# Patient Record
Sex: MALE | Race: BLACK OR AFRICAN AMERICAN | Employment: PART TIME | URBAN - METROPOLITAN AREA
[De-identification: names, ages, dates, MRNs, and addresses within clinical notes are randomized per-mention and may not be internally consistent; named-entity substitution may affect disease eponyms.]

---

## 2019-10-01 ENCOUNTER — APPOINTMENT (OUTPATIENT)
Dept: CT IMAGING | Age: 49
End: 2019-10-01
Attending: PHYSICIAN ASSISTANT
Payer: MEDICAID

## 2019-10-01 ENCOUNTER — HOSPITAL ENCOUNTER (EMERGENCY)
Age: 49
Discharge: HOME | End: 2019-10-01
Attending: EMERGENCY MEDICINE | Admitting: EMERGENCY MEDICINE
Payer: MEDICAID

## 2019-10-01 VITALS
RESPIRATION RATE: 16 BRPM | HEART RATE: 55 BPM | SYSTOLIC BLOOD PRESSURE: 152 MMHG | DIASTOLIC BLOOD PRESSURE: 91 MMHG | OXYGEN SATURATION: 100 % | TEMPERATURE: 98 F

## 2019-10-01 DIAGNOSIS — R10.9 LEFT FLANK PAIN: Primary | ICD-10-CM

## 2019-10-01 LAB
ALBUMIN SERPL-MCNC: 3.8 G/DL (ref 3.5–5)
ALBUMIN/GLOB SERPL: 1.2 {RATIO} (ref 1.1–2.2)
ALP SERPL-CCNC: 80 U/L (ref 45–117)
ALT SERPL-CCNC: 49 U/L (ref 12–78)
ANION GAP SERPL CALC-SCNC: 7 MMOL/L (ref 5–15)
APPEARANCE UR: CLEAR
AST SERPL-CCNC: 35 U/L (ref 15–37)
ATRIAL RATE: 59 BPM
BACTERIA URNS QL MICRO: NEGATIVE /HPF
BASOPHILS # BLD: 0 K/UL (ref 0–0.1)
BASOPHILS NFR BLD: 1 % (ref 0–1)
BILIRUB SERPL-MCNC: 0.4 MG/DL (ref 0.2–1)
BILIRUB UR QL: NEGATIVE
BUN SERPL-MCNC: 7 MG/DL (ref 6–20)
BUN/CREAT SERPL: 10 (ref 12–20)
CALCIUM SERPL-MCNC: 8.5 MG/DL (ref 8.5–10.1)
CALCULATED P AXIS, ECG09: 35 DEGREES
CALCULATED R AXIS, ECG10: 13 DEGREES
CALCULATED T AXIS, ECG11: 24 DEGREES
CHLORIDE SERPL-SCNC: 107 MMOL/L (ref 97–108)
CO2 SERPL-SCNC: 25 MMOL/L (ref 21–32)
COLOR UR: NORMAL
CREAT SERPL-MCNC: 0.72 MG/DL (ref 0.7–1.3)
DIAGNOSIS, 93000: NORMAL
DIFFERENTIAL METHOD BLD: NORMAL
EOSINOPHIL # BLD: 0 K/UL (ref 0–0.4)
EOSINOPHIL NFR BLD: 0 % (ref 0–7)
EPITH CASTS URNS QL MICRO: NORMAL /LPF
ERYTHROCYTE [DISTWIDTH] IN BLOOD BY AUTOMATED COUNT: 13.4 % (ref 11.5–14.5)
GLOBULIN SER CALC-MCNC: 3.1 G/DL (ref 2–4)
GLUCOSE SERPL-MCNC: 165 MG/DL (ref 65–100)
GLUCOSE UR STRIP.AUTO-MCNC: NEGATIVE MG/DL
HCT VFR BLD AUTO: 44.7 % (ref 36.6–50.3)
HGB BLD-MCNC: 15.1 G/DL (ref 12.1–17)
HGB UR QL STRIP: NEGATIVE
HYALINE CASTS URNS QL MICRO: NORMAL /LPF (ref 0–5)
IMM GRANULOCYTES # BLD AUTO: 0 K/UL
IMM GRANULOCYTES NFR BLD AUTO: 0 %
KETONES UR QL STRIP.AUTO: NEGATIVE MG/DL
LEUKOCYTE ESTERASE UR QL STRIP.AUTO: NEGATIVE
LIPASE SERPL-CCNC: 173 U/L (ref 73–393)
LYMPHOCYTES # BLD: 1.8 K/UL (ref 0.8–3.5)
LYMPHOCYTES NFR BLD: 44 % (ref 12–49)
MCH RBC QN AUTO: 30.9 PG (ref 26–34)
MCHC RBC AUTO-ENTMCNC: 33.8 G/DL (ref 30–36.5)
MCV RBC AUTO: 91.6 FL (ref 80–99)
MONOCYTES # BLD: 0.5 K/UL (ref 0–1)
MONOCYTES NFR BLD: 12 % (ref 5–13)
NEUTS SEG # BLD: 1.8 K/UL (ref 1.8–8)
NEUTS SEG NFR BLD: 43 % (ref 32–75)
NITRITE UR QL STRIP.AUTO: NEGATIVE
NRBC # BLD: 0 K/UL (ref 0–0.01)
NRBC BLD-RTO: 0 PER 100 WBC
P-R INTERVAL, ECG05: 150 MS
PH UR STRIP: 5.5 [PH] (ref 5–8)
PLATELET # BLD AUTO: 235 K/UL (ref 150–400)
PMV BLD AUTO: 10.1 FL (ref 8.9–12.9)
POTASSIUM SERPL-SCNC: 3.5 MMOL/L (ref 3.5–5.1)
PROT SERPL-MCNC: 6.9 G/DL (ref 6.4–8.2)
PROT UR STRIP-MCNC: NEGATIVE MG/DL
Q-T INTERVAL, ECG07: 410 MS
QRS DURATION, ECG06: 86 MS
QTC CALCULATION (BEZET), ECG08: 405 MS
RBC # BLD AUTO: 4.88 M/UL (ref 4.1–5.7)
RBC #/AREA URNS HPF: NORMAL /HPF (ref 0–5)
RBC MORPH BLD: NORMAL
SODIUM SERPL-SCNC: 139 MMOL/L (ref 136–145)
SP GR UR REFRACTOMETRY: 1.01 (ref 1–1.03)
TROPONIN I SERPL-MCNC: <0.05 NG/ML
UR CULT HOLD, URHOLD: NORMAL
UROBILINOGEN UR QL STRIP.AUTO: 0.2 EU/DL (ref 0.2–1)
VENTRICULAR RATE, ECG03: 59 BPM
WBC # BLD AUTO: 4.1 K/UL (ref 4.1–11.1)
WBC URNS QL MICRO: NORMAL /HPF (ref 0–4)

## 2019-10-01 PROCEDURE — 85025 COMPLETE CBC W/AUTO DIFF WBC: CPT

## 2019-10-01 PROCEDURE — 83690 ASSAY OF LIPASE: CPT

## 2019-10-01 PROCEDURE — 80053 COMPREHEN METABOLIC PANEL: CPT

## 2019-10-01 PROCEDURE — 93005 ELECTROCARDIOGRAM TRACING: CPT

## 2019-10-01 PROCEDURE — 36415 COLL VENOUS BLD VENIPUNCTURE: CPT

## 2019-10-01 PROCEDURE — 99284 EMERGENCY DEPT VISIT MOD MDM: CPT

## 2019-10-01 PROCEDURE — 74176 CT ABD & PELVIS W/O CONTRAST: CPT

## 2019-10-01 PROCEDURE — 84484 ASSAY OF TROPONIN QUANT: CPT

## 2019-10-01 PROCEDURE — 96374 THER/PROPH/DIAG INJ IV PUSH: CPT

## 2019-10-01 PROCEDURE — 74011000250 HC RX REV CODE- 250: Performed by: PHYSICIAN ASSISTANT

## 2019-10-01 PROCEDURE — 81001 URINALYSIS AUTO W/SCOPE: CPT

## 2019-10-01 PROCEDURE — 74011250636 HC RX REV CODE- 250/636: Performed by: PHYSICIAN ASSISTANT

## 2019-10-01 RX ORDER — NAPROXEN 500 MG/1
500 TABLET ORAL 2 TIMES DAILY WITH MEALS
Qty: 20 TAB | Refills: 0 | Status: SHIPPED | OUTPATIENT
Start: 2019-10-01 | End: 2019-10-11

## 2019-10-01 RX ORDER — LIDOCAINE 50 MG/G
1 PATCH TOPICAL EVERY 24 HOURS
Status: DISCONTINUED | OUTPATIENT
Start: 2019-10-01 | End: 2019-10-01 | Stop reason: HOSPADM

## 2019-10-01 RX ORDER — LIDOCAINE 40 MG/G
CREAM TOPICAL
Qty: 15 G | Refills: 0 | Status: SHIPPED | OUTPATIENT
Start: 2019-10-01 | End: 2020-02-06 | Stop reason: ALTCHOICE

## 2019-10-01 RX ORDER — METHOCARBAMOL 500 MG/1
500 TABLET, FILM COATED ORAL
Qty: 20 TAB | Refills: 0 | Status: SHIPPED | OUTPATIENT
Start: 2019-10-01 | End: 2020-02-06 | Stop reason: ALTCHOICE

## 2019-10-01 RX ORDER — KETOROLAC TROMETHAMINE 30 MG/ML
15 INJECTION, SOLUTION INTRAMUSCULAR; INTRAVENOUS
Status: COMPLETED | OUTPATIENT
Start: 2019-10-01 | End: 2019-10-01

## 2019-10-01 RX ADMIN — KETOROLAC TROMETHAMINE 15 MG: 30 INJECTION, SOLUTION INTRAMUSCULAR at 09:48

## 2019-10-01 NOTE — ED TRIAGE NOTES
Patient reports swelling and pain to the left upper side he reports it is like the pain he has with pancreatitis flairs.

## 2019-10-01 NOTE — ED NOTES
Patient given discharge instruction and prescription with work note, verbalized understanding.  Pt ambulatory out of ER with steady gait

## 2019-10-01 NOTE — ED PROVIDER NOTES
53 y/o male with PMHx of pancreatitis, presenting with complaint of abdominal pain. The patient states that 2 days ago he began to have left-sided abdominal pain and left flank pain, which worsened today and feels like it's \"locked up. \" He reports similar symptoms in the past due to pancreatitis. The pain is currently 7/10, with occasional radiation to his back or chest. He has not taken anything to relieve the pain. He reports baseline heavy lifting at work, but denies any recent falls or other traumatic injuries. He reports some intermittent constipation and diarrhea, but denies fevers, nausea, vomiting, dysuria, hematuria, or shortness of breath. The history is provided by the patient. Past Medical History:   Diagnosis Date    Pancreatitis        No past surgical history on file. No family history on file.     Social History     Socioeconomic History    Marital status:      Spouse name: Not on file    Number of children: Not on file    Years of education: Not on file    Highest education level: Not on file   Occupational History    Not on file   Social Needs    Financial resource strain: Not on file    Food insecurity:     Worry: Not on file     Inability: Not on file    Transportation needs:     Medical: Not on file     Non-medical: Not on file   Tobacco Use    Smoking status: Current Every Day Smoker     Packs/day: 0.25    Smokeless tobacco: Never Used   Substance and Sexual Activity    Alcohol use: No    Drug use: No    Sexual activity: Not on file   Lifestyle    Physical activity:     Days per week: Not on file     Minutes per session: Not on file    Stress: Not on file   Relationships    Social connections:     Talks on phone: Not on file     Gets together: Not on file     Attends Restorationist service: Not on file     Active member of club or organization: Not on file     Attends meetings of clubs or organizations: Not on file     Relationship status: Not on file   Lia Intimate partner violence:     Fear of current or ex partner: Not on file     Emotionally abused: Not on file     Physically abused: Not on file     Forced sexual activity: Not on file   Other Topics Concern    Not on file   Social History Narrative    Not on file         ALLERGIES: Pork derived (porcine) and Sulfa (sulfonamide antibiotics)    Review of Systems   Constitutional: Negative for chills and fever. Respiratory: Negative for shortness of breath. Cardiovascular: Positive for chest pain (when flank pain is severe). Gastrointestinal: Positive for constipation and diarrhea. Negative for nausea and vomiting. Genitourinary: Negative for dysuria, frequency, hematuria and urgency. Musculoskeletal: Positive for back pain. Neurological: Negative for syncope and light-headedness. All other systems reviewed and are negative. Vitals:    10/01/19 0914   BP: (!) 161/95   Pulse: 60   Resp: 18   Temp: 98.1 °F (36.7 °C)   SpO2: 100%            Physical Exam   Constitutional: He is oriented to person, place, and time. He appears well-developed and well-nourished. No distress. HENT:   Head: Normocephalic and atraumatic. Eyes: Conjunctivae and EOM are normal.   Cardiovascular: Normal rate, regular rhythm and normal heart sounds. Pulmonary/Chest: Effort normal and breath sounds normal.   No chest wall tenderness. Abdominal: Soft. Bowel sounds are normal. He exhibits no distension. There is tenderness (mild LLQ). There is no guarding.   + left flank tenderness to palpation. Neurological: He is alert and oriented to person, place, and time. Skin: Skin is warm and dry. He is not diaphoretic. Nursing note and vitals reviewed.        MDM  Number of Diagnoses or Management Options  Left flank pain:      Amount and/or Complexity of Data Reviewed  Clinical lab tests: ordered and reviewed  Tests in the radiology section of CPT®: ordered and reviewed    Patient Progress  Patient progress: stable Procedures  ED EKG interpretation:  Rhythm: sinus bradycardia; and regular . Rate (approx.): 59; Axis: normal; ST/T wave: normal.        53 y/o male with PMHx of pancreatitis, presenting with complaint of abdominal pain. The patient is well-appearing in no acute distress, abdominal exam benign. UA is negative for infection. EKG without acute ischemic changes, troponin negative. CBC, CMP and lipase are unremarkable. CT abd/pelvis w/o contrast reveals no evidence of kidney stones or other acute abnormalities. Suspect musculoskeletal pain due to heavy lifting. Plan is for discharge home with Rx for naproxen, robaxin and topical lidocaine cream, with instructions for PCP follow up. Strict ED return precautions discussed and provided in writing at time of discharge. The patient verbalized understanding and agreement with this plan. I was personally available for consultation in the emergency department. I have reviewed the chart and agree with the documentation recorded by the [facility], including the assessment, treatment plan, and disposition.   Noel Lott MD

## 2019-10-01 NOTE — LETTER
Jailene Mauricio 57 
[address] [facility] [id number] 
[phone] Work/School Note Date: 10/1/2019 To Whom It May concern: 
 
Louis Haile was seen and treated today in the emergency room by the following provider(s): 
Attending Provider: Polina Stanton MD 
Physician Assistant: OWEN Roberts. Louis Haile may return to work on 10/1/19. Please avoid any heavy lifting until 10/5/19. Sincerely, OWEN Aldrich

## 2020-02-06 ENCOUNTER — HOSPITAL ENCOUNTER (OUTPATIENT)
Dept: LAB | Age: 50
Discharge: HOME | End: 2020-02-06

## 2020-02-06 ENCOUNTER — TELEPHONE (OUTPATIENT)
Dept: INTERNAL MEDICINE CLINIC | Age: 50
End: 2020-02-06

## 2020-02-06 ENCOUNTER — OFFICE VISIT (OUTPATIENT)
Dept: INTERNAL MEDICINE CLINIC | Age: 50
End: 2020-02-06

## 2020-02-06 VITALS
DIASTOLIC BLOOD PRESSURE: 107 MMHG | OXYGEN SATURATION: 98 % | HEART RATE: 69 BPM | WEIGHT: 201 LBS | HEIGHT: 68 IN | SYSTOLIC BLOOD PRESSURE: 151 MMHG | TEMPERATURE: 98.1 F | RESPIRATION RATE: 20 BRPM | BODY MASS INDEX: 30.46 KG/M2

## 2020-02-06 DIAGNOSIS — R10.9 LEFT LATERAL ABDOMINAL PAIN: ICD-10-CM

## 2020-02-06 DIAGNOSIS — R19.4 FREQUENT BOWEL MOVEMENTS: ICD-10-CM

## 2020-02-06 DIAGNOSIS — Z23 ENCOUNTER FOR IMMUNIZATION: ICD-10-CM

## 2020-02-06 DIAGNOSIS — H53.8 BLURRED VISION, BILATERAL: ICD-10-CM

## 2020-02-06 DIAGNOSIS — I10 ESSENTIAL HYPERTENSION: ICD-10-CM

## 2020-02-06 DIAGNOSIS — Z83.3 FAMILY HISTORY OF DIABETES MELLITUS: ICD-10-CM

## 2020-02-06 DIAGNOSIS — D23.4 DERMOID CYST OF SCALP: ICD-10-CM

## 2020-02-06 DIAGNOSIS — Z87.19 HISTORY OF PANCREATITIS: ICD-10-CM

## 2020-02-06 DIAGNOSIS — I10 ESSENTIAL HYPERTENSION: Primary | ICD-10-CM

## 2020-02-06 DIAGNOSIS — R73.09 ELEVATED GLUCOSE: Primary | ICD-10-CM

## 2020-02-06 LAB
ALBUMIN SERPL-MCNC: 4.4 G/DL (ref 3.5–5)
ALBUMIN/GLOB SERPL: 1.3 {RATIO} (ref 1.1–2.2)
ALP SERPL-CCNC: 98 U/L (ref 45–117)
ALT SERPL-CCNC: 50 U/L (ref 12–78)
ANION GAP SERPL CALC-SCNC: 2 MMOL/L (ref 5–15)
AST SERPL-CCNC: 28 U/L (ref 15–37)
BILIRUB SERPL-MCNC: 0.5 MG/DL (ref 0.2–1)
BUN SERPL-MCNC: 8 MG/DL (ref 6–20)
BUN/CREAT SERPL: 9 (ref 12–20)
CALCIUM SERPL-MCNC: 9.3 MG/DL (ref 8.5–10.1)
CHLORIDE SERPL-SCNC: 107 MMOL/L (ref 97–108)
CO2 SERPL-SCNC: 29 MMOL/L (ref 21–32)
CREAT SERPL-MCNC: 0.87 MG/DL (ref 0.7–1.3)
GLOBULIN SER CALC-MCNC: 3.3 G/DL (ref 2–4)
GLUCOSE SERPL-MCNC: 131 MG/DL (ref 65–100)
POTASSIUM SERPL-SCNC: 4.4 MMOL/L (ref 3.5–5.1)
PROT SERPL-MCNC: 7.7 G/DL (ref 6.4–8.2)
SODIUM SERPL-SCNC: 138 MMOL/L (ref 136–145)

## 2020-02-06 RX ORDER — AMLODIPINE BESYLATE 10 MG/1
10 TABLET ORAL DAILY
Qty: 30 TAB | Refills: 2 | Status: SHIPPED | OUTPATIENT
Start: 2020-02-06 | End: 2020-10-06 | Stop reason: ALTCHOICE

## 2020-02-06 NOTE — TELEPHONE ENCOUNTER
Writer received call from the lab and they cannot run the A1C, they do not have the right tubing/blood for this test.

## 2020-02-06 NOTE — PROGRESS NOTES
[facility] patient has been in the hospital and [facility] with Pancreatitis, patient was taken off all medications. Patient is currently not taking anything for his BP. Patient to have a flu shot today. Patient c/o of swelling and cramping on left flank area, patient having multiple BM in the morning, becomes urgent. Patient does not have a gastro doctor. Jeff Cooney is a 54 y.o. male who presents for routine immunizations. He denies any symptoms , reactions or allergies that would exclude them from being immunized today. Risks and adverse reactions were discussed and the VIS was given to them. All questions were addressed. He was observed for 5 min post injection. There were no reactions observed.     Steffi Diaz LPN

## 2020-02-06 NOTE — PROGRESS NOTES
Chief Complaint   Patient presents with   [address] Hypertension     he is a 54y.o. year old male who presents for evaluation. Patient presents to the office to get established. He reports he has not seen a physician in a long period of time. He has been seen at the ER for pancreatitis. The patient reports the last time he was seen at the ER it was for left side abdominal pain. He states at that time he was told that he was not currently having problems with his pancreas. He states he was discharged but was not told if he should follow-up with a gastroenterologist or not patient also reports that he has noticed he has had some blurred vision for some time. Patient denies having frequency of urination or the sensation of being thirsty all the time. The patient also has an area on the top of his head that he would like to have looked at. Reports that the area is not tender to touch. The patient reports that the pain has noticed that his bowel habits have changed he would normally have 2-3 bowel movements each morning. He states once his bowels has been empty he is good until the next day. 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 He states he has not noticed any difference in his bowel habits based on foods that he eat. Patient states actually normally eats 1 to 2 meals a day. He is not much of an eater. He reports he does not get regular exercise.     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[employer])   Tobacco Use    Smoking status: Current Every Day Smoker     Packs/day: 0.25     Types: Cigarettes     Start date: 2/6/2015    Smokeless tobacco: Never Used   Substance and Sexual Activity    Alcohol use: Yes     Frequency: 4 or more times a week     Drinks per session: 3 or 4     Binge frequency: Weekly     Comment: wine    Drug use: Yes     Frequency: 1.0 times per week     Types: Marijuana     Comment: intermittent cigar size last one week    Sexual activity: Yes     Partners: Female     Birth control/protection: None     Comment: none         Review of Systems - negative except as listed above    Objective:     Vitals:    02/06/20 0822 02/06/20 0842   BP: (!) 149/111 (!) 151/107   Pulse: 73 69   Resp: 20    Temp: 98.1 °F (36.7 °C)    TempSrc: Oral    SpO2: 98%    Weight: 201 lb (91.2 kg)    Height: 5' 8\" (1.727 m)      Physical Examination: General appearance - alert, well appearing, and in no distress  Mental status - normal mood, behavior, speech, dress, motor activity, and thought processes  Chest - clear to auscultation, no wheezes, rales or rhonchi, symmetric air entry  Heart - normal rate and regular rhythm, no murmurs noted  Abdomen - soft, nontender, nondistended, no masses or organomegaly  Extremities - no pedal edema noted, intact peripheral pulses  Skin -soft palpable mass noted of the left side of scalp. The mass is very soft and smooth feeling. It is approximately 2 cm in size. Assessment/ Plan:   Diagnoses and all orders for this visit:    1. Essential hypertension  -     amLODIPine (NORVASC) 10 mg tablet; Take 1 Tab by mouth daily.  -     METABOLIC PANEL, COMPREHENSIVE; Future  -     REFERRAL TO OPHTHALMOLOGY    2.  Frequent bowel movements  -     REFERRAL TO GASTROENTEROLOGY    3. History of pancreatitis  -     REFERRAL TO GASTROENTEROLOGY    4. Left lateral abdominal pain  - REFERRAL TO GASTROENTEROLOGY    5. Blurred vision, bilateral  -     REFERRAL TO OPHTHALMOLOGY    6. Dermoid cyst of scalp    7. Encounter for immunization  -     INFLUENZA VIRUS VAC QUAD,SPLIT,PRESV FREE SYRINGE IM  -     IA IMMUNIZ ADMIN,1 SINGLE/COMB VAC/TOXOID    Patient I discussed his elevated blood pressure here in the office today. He states at one point when he was admitted to the hospital he was on blood pressure medicine but he was told that he could stop it once he went home. I have started the patient back on Norvasc since he has been on this previously when he was in the hospital.  I would like for him to return back to the office in 2 weeks so that we may recheck his blood pressure. The patient has been given a referral to an ophthalmologist.  I explained to him the area on his scalp appears to be a cyst.  At this time he does not want to see a dermatologist to have this removed. I would like for the patient to get labs done to see if his glucose is elevated and check kidney function. The patient has been given a referral to the gastroenterologist for further evaluation of his left sided pain and change in bowels. Patient will receive a flu shot here in the office today. I have discussed the diagnosis with the patient and the intended plan as seen in the above orders. The patient has received an after-visit summary and questions were answered concerning future plans.      Medication Side Effects and Warnings were discussed with patient: yes  Patient Labs were reviewed and or requested: n/a  Patient Past Records were reviewed and or requested  yes    Martha Chacon PA-C

## 2020-02-06 NOTE — PROGRESS NOTES
Writer contacted patient to inform of lab results and added lab per Hector Welch, patient verbalized understanding.

## 2020-02-06 NOTE — TELEPHONE ENCOUNTER
Please let the patient know his glucose was a little elevated.  I would like for him to get a hemoglobin a 1c lab slip will be up front

## 2020-02-06 NOTE — PATIENT INSTRUCTIONS
Vaccine Information Statement    Influenza (Flu) Vaccine (Inactivated or Recombinant): What You Need to Know    Many Vaccine Information Statements are available in Croatian and other languages. See www.immunize.org/vis  Hojas de información sobre vacunas están disponibles en español y en muchos otros idiomas. Visite www.immunize.org/vis    1. Why get vaccinated? Influenza vaccine can prevent influenza (flu). Flu is a contagious disease that spreads around the [facility] every year, usually between October and May. Anyone can get the flu, but it is more dangerous for some people. Infants and young children, people 72years of age and older, pregnant women, and people with certain health conditions or a weakened immune system are at greatest risk of flu complications. Pneumonia, bronchitis, sinus infections and ear infections are examples of flu-related complications. If you have a medical condition, such as heart disease, cancer or diabetes, flu can make it worse. Flu can cause fever and chills, sore throat, muscle aches, fatigue, cough, headache, and runny or stuffy nose. Some people may have vomiting and diarrhea, though this is more common in children than adults. Each year thousands of people in the [facility] die from flu, and many more are hospitalized. Flu vaccine prevents millions of illnesses and flu-related visits to the doctor each year. 2. Influenza vaccines     CDC recommends everyone 10months of age and older get vaccinated every flu season. Children 6 months through 6years of age may need 2 doses during a single flu season. Everyone else needs only 1 dose each flu season. It takes about 2 weeks for protection to develop after vaccination. There are many flu viruses, and they are always changing. Each year a new flu vaccine is made to protect against three or four viruses that are likely to cause disease in the upcoming flu season.  Even when the vaccine doesnt exactly match these viruses, it may still provide some protection. Influenza vaccine does not cause flu. Influenza vaccine may be given at the same time as other vaccines. 3. Talk with your health care provider    Tell your vaccine provider if the person getting the vaccine:   Has had an allergic reaction after a previous dose of influenza vaccine, or has any severe, life-threatening allergies.  Has ever had Guillain-Barré Syndrome (also called GBS). In some cases, your health care provider may decide to postpone influenza vaccination to a future visit. People with minor illnesses, such as a cold, may be vaccinated. People who are moderately or severely ill should usually wait until they recover before getting influenza vaccine. Your health care provider can give you more information. 4. Risks of a reaction     Soreness, redness, and swelling where shot is given, fever, muscle aches, and headache can happen after influenza vaccine.  There may be a very small increased risk of Guillain-Barré Syndrome (GBS) after inactivated influenza vaccine (the flu shot). [employer] children who get the flu shot along with pneumococcal vaccine (PCV13), and/or DTaP vaccine at the same time might be slightly more likely to have a seizure caused by fever. Tell your health care provider if a child who is getting flu vaccine has ever had a seizure. People sometimes faint after medical procedures, including vaccination. Tell your provider if you feel dizzy or have vision changes or ringing in the ears. As with any medicine, there is a very remote chance of a vaccine causing a severe allergic reaction, other serious injury, or death. 5. What if there is a serious problem? An allergic reaction could occur after the vaccinated person leaves the clinic.  If you see signs of a severe allergic reaction (hives, swelling of the face and throat, difficulty breathing, a fast heartbeat, dizziness, or weakness), call 9-1-1 and get the person to the nearest hospital.    For other signs that concern you, call your health care provider. Adverse reactions should be reported to the Vaccine Adverse Event Reporting System (VAERS). Your health care provider will usually file this report, or you can do it yourself. Visit the VAERS website at www.vaers. [facility].gov or call 1-890.438.5879. VAERS is only for reporting reactions, and VAERS staff do not give medical advice. 6. The National Vaccine Injury Compensation Program    The [facility] Vaccine Injury Compensation Program (VICP) is a federal program that was created to compensate people who may have been injured by certain vaccines. Visit the VICP website at www.[place].gov/vaccinecompensation or call 8-402.826.8399 to learn about the program and about filing a claim. There is a time limit to file a claim for compensation. 7. How can I learn more?  Ask your health care provider.  Call your local or state health department.  Contact the Centers for Disease Control and Prevention (CDC):  - Call 0-636.226.1593 (1-800-CDC-INFO) or  - Visit CDCs influenza website at www.cdc.gov/flu    Vaccine Information Statement (Interim)  Inactivated Influenza Vaccine   8/15/2019  [address] ARACELIS [address] [id number]-04   Department of Health and Human Services  Centers for Disease Control and Prevention    Office Use Only

## 2020-02-06 NOTE — PROGRESS NOTES
Please let the patient know his glucose was elevated. I would like to add hemoglobin a1c the rest of the labs are okay.   thank you

## 2020-02-10 ENCOUNTER — HOSPITAL ENCOUNTER (OUTPATIENT)
Dept: LAB | Age: 50
Discharge: HOME | End: 2020-02-10

## 2020-02-10 DIAGNOSIS — Z83.3 FAMILY HISTORY OF DIABETES MELLITUS: ICD-10-CM

## 2020-02-10 DIAGNOSIS — R73.09 ELEVATED GLUCOSE: ICD-10-CM

## 2020-02-10 LAB
EST. AVERAGE GLUCOSE BLD GHB EST-MCNC: 128 MG/DL
HBA1C MFR BLD: 6.1 % (ref 4–5.6)

## 2020-02-11 NOTE — PROGRESS NOTES
Please let the patient know his hemoglobin a1c is 6.1. this is consider to be pre-diabetes. That means the patient should limit sugary foods and foods high in carbs. Also get regular exercise and work on weight.  thanks

## 2020-02-27 ENCOUNTER — OFFICE VISIT (OUTPATIENT)
Dept: INTERNAL MEDICINE CLINIC | Age: 50
End: 2020-02-27

## 2020-02-27 VITALS
HEART RATE: 62 BPM | DIASTOLIC BLOOD PRESSURE: 87 MMHG | SYSTOLIC BLOOD PRESSURE: 125 MMHG | TEMPERATURE: 98.4 F | RESPIRATION RATE: 20 BRPM | HEIGHT: 68 IN | BODY MASS INDEX: 30.77 KG/M2 | WEIGHT: 203 LBS | OXYGEN SATURATION: 97 %

## 2020-02-27 DIAGNOSIS — Z13.220 SCREENING CHOLESTEROL LEVEL: ICD-10-CM

## 2020-02-27 DIAGNOSIS — R73.03 PREDIABETES: ICD-10-CM

## 2020-02-27 DIAGNOSIS — I10 ESSENTIAL HYPERTENSION: Primary | ICD-10-CM

## 2020-02-27 NOTE — PROGRESS NOTES
Subjective:     Denise Stevens is a 54 y.o. male who presents for follow up of hypertension. Diet and Lifestyle: generally follows a low sodium diet  Home BP Monitoring: is not measured at home    Cardiovascular ROS: taking medications as instructed, no medication side effects noted, no TIA's, no chest pain on exertion, no dyspnea on exertion, no swelling of ankles. New concerns: recheck blood pressure. Reports he has not had any problems with the medication     Patient Active Problem List    Diagnosis Date Noted    Hypertension 06/07/2016    Chronic pancreatitis ([facility] [address] 06/07/2016     Current Outpatient Medications   Medication Sig Dispense Refill    amLODIPine (NORVASC) 10 mg tablet Take 1 Tab by mouth daily. 30 Tab 2     Allergies   Allergen Reactions    Pork Derived (Porcine) Swelling and Angioedema    Sulfa (Sulfonamide Antibiotics) Rash             Review of Systems, additional:  Pertinent items are noted in HPI. Objective:     Visit Vitals  /87   Pulse 62   Temp 98.4 °F (36.9 °C) (Oral)   Resp 20   Ht 5' 8\" (1.727 m)   Wt 203 lb (92.1 kg)   SpO2 97%   BMI 30.87 kg/m²     Appearance: alert, well appearing, and in no distress. General exam: CVS exam BP noted to be well controlled today in office, S1, S2 normal, no gallop, no murmur, chest clear, no edema. Lab review: previous labs reviewed. Assessment/Plan:     hypertension well controlled. current treatment plan is effective, no change in therapy  reviewed diet, exercise and weight control. Diagnoses and all orders for this visit:    1. Essential hypertension    2. Prediabetes    3. Screening cholesterol level  -     LIPID PANEL; Future    patient to continue his current medication regiment. Advised him to make sure he watch his diet and try to get regular exercise. He should get his cholesterol checked. He will be contacted with the results once back. Patient should follow up in 6 months.

## 2020-10-06 ENCOUNTER — OFFICE VISIT (OUTPATIENT)
Dept: INTERNAL MEDICINE CLINIC | Age: 50
End: 2020-10-06
Payer: MEDICAID

## 2020-10-06 VITALS
DIASTOLIC BLOOD PRESSURE: 100 MMHG | HEART RATE: 64 BPM | BODY MASS INDEX: 29.16 KG/M2 | TEMPERATURE: 96 F | HEIGHT: 68 IN | RESPIRATION RATE: 16 BRPM | OXYGEN SATURATION: 98 % | WEIGHT: 192.4 LBS | SYSTOLIC BLOOD PRESSURE: 151 MMHG

## 2020-10-06 DIAGNOSIS — R73.03 PREDIABETES: ICD-10-CM

## 2020-10-06 DIAGNOSIS — Z23 NEEDS FLU SHOT: ICD-10-CM

## 2020-10-06 DIAGNOSIS — I10 ESSENTIAL HYPERTENSION: ICD-10-CM

## 2020-10-06 DIAGNOSIS — L02.224 BOIL OF GROIN: Primary | ICD-10-CM

## 2020-10-06 DIAGNOSIS — D17.0 LIPOMA OF HEAD: ICD-10-CM

## 2020-10-06 LAB
ALBUMIN SERPL-MCNC: 4.2 G/DL (ref 3.5–5)
ALBUMIN/GLOB SERPL: 1.4 {RATIO} (ref 1.1–2.2)
ALP SERPL-CCNC: 90 U/L (ref 45–117)
ALT SERPL-CCNC: 57 U/L (ref 12–78)
ANION GAP SERPL CALC-SCNC: 3 MMOL/L (ref 5–15)
AST SERPL-CCNC: 25 U/L (ref 15–37)
BILIRUB SERPL-MCNC: 0.4 MG/DL (ref 0.2–1)
BUN SERPL-MCNC: 6 MG/DL (ref 6–20)
BUN/CREAT SERPL: 7 (ref 12–20)
CALCIUM SERPL-MCNC: 9.1 MG/DL (ref 8.5–10.1)
CHLORIDE SERPL-SCNC: 107 MMOL/L (ref 97–108)
CO2 SERPL-SCNC: 29 MMOL/L (ref 21–32)
CREAT SERPL-MCNC: 0.89 MG/DL (ref 0.7–1.3)
EST. AVERAGE GLUCOSE BLD GHB EST-MCNC: 131 MG/DL
GLOBULIN SER CALC-MCNC: 3.1 G/DL (ref 2–4)
GLUCOSE SERPL-MCNC: 104 MG/DL (ref 65–100)
HBA1C MFR BLD: 6.2 % (ref 4–5.6)
POTASSIUM SERPL-SCNC: 4.3 MMOL/L (ref 3.5–5.1)
PROT SERPL-MCNC: 7.3 G/DL (ref 6.4–8.2)
SODIUM SERPL-SCNC: 139 MMOL/L (ref 136–145)

## 2020-10-06 PROCEDURE — 99214 OFFICE O/P EST MOD 30 MIN: CPT | Performed by: PHYSICIAN ASSISTANT

## 2020-10-06 PROCEDURE — 90686 IIV4 VACC NO PRSV 0.5 ML IM: CPT | Performed by: PHYSICIAN ASSISTANT

## 2020-10-06 RX ORDER — LISINOPRIL 40 MG/1
40 TABLET ORAL DAILY
Qty: 30 TAB | Refills: 3 | Status: SHIPPED | OUTPATIENT
Start: 2020-10-06 | End: 2021-02-15

## 2020-10-06 RX ORDER — DOXYCYCLINE 100 MG/1
100 TABLET ORAL 2 TIMES DAILY
Qty: 20 TAB | Refills: 0 | Status: SHIPPED | OUTPATIENT
Start: 2020-10-06 | End: 2020-11-10 | Stop reason: ALTCHOICE

## 2020-10-06 NOTE — PROGRESS NOTES
Chief Complaint   Patient presents with    Cyst     boil on left side of gorin , onset week ago     Immunization/Injection     flu shot        1. Have you been to the ER, urgent care clinic since your last visit? Hospitalized since your last visit? No    2. Have you seen or consulted any other health care providers outside of the [address] [place] [address] since your last visit? Include any pap smears or colon screening.  No

## 2020-10-07 NOTE — PROGRESS NOTES
Please let the patient know he still in the prediabetic range. It is about the same as the last time. Advised him to please work on watching diet, losing weight, and starting a regular exercise regiment. Walking is a great exercise. Please let him know that his comprehensive lab was fine.

## 2020-10-07 NOTE — PROGRESS NOTES
Chief Complaint   Patient presents with    Cyst     boil on left side of gorin , onset week ago     Immunization/Injection     flu shot      he is a 52y.o. year old male who presents for evaluation. Patient presents the office today for a number of concerns. He reports that he is stopped taking his blood pressure medicine because it was causing some swelling of his legs. Patient was taking amlodipine at one point. He also reports that he has a cyst or boil of some kind on the inside of his left groin area. Patient reports that the area is sore to touch. Patient also reports that he is interested in having the cyst on his head removed. Patient would like to get a referral if possible. Lastly the patient would like to get his flu vaccine today. Reviewed and agree with Nurse Note and duplicated in this note. Reviewed PmHx, RxHx, FmHx, SocHx, AllgHx and updated and dated in the chart. Review of Systems - negative except as listed above    Objective:     Vitals:    10/06/20 1050   BP: (!) 151/100   Pulse: 64   Resp: 16   Temp: (!) 96 °F (35.6 °C)   TempSrc: Temporal   SpO2: 98%   Weight: 192 lb 6.4 oz (87.3 kg)   Height: 5' 8\" (1.727 m)     Physical Examination: General appearance - alert, well appearing, and in no distress and overweight  Mental status - normal mood, behavior, speech, dress, motor activity, and thought processes  Chest - clear to auscultation, no wheezes, rales or rhonchi, symmetric air entry  Heart - normal rate and regular rhythm, no murmurs noted  Skin -inner left thigh ways papule noted. Area a little larger than pea-sized. Area is tender to palpation. No apparent drainage noted from the area. Head soft mass appreciated just slightly to midline of scalp. No tenderness with palpation    Assessment/ Plan:   Diagnoses and all orders for this visit:    1. Boil of groin  -     doxycycline (ADOXA) 100 mg tablet; Take 1 Tab by mouth two (2) times a day.     2. Essential hypertension  -     lisinopriL (PRINIVIL, ZESTRIL) 40 mg tablet; Take 1 Tab by mouth daily.  -     METABOLIC PANEL, COMPREHENSIVE; Future    3. Prediabetes  -     METABOLIC PANEL, COMPREHENSIVE; Future  -     HEMOGLOBIN A1C WITH EAG; Future    4. Lipoma of head  -     REFERRAL TO DERMATOLOGY    5. Needs flu shot  -     INFLUENZA VIRUS VAC QUAD,SPLIT,PRESV FREE SYRINGE IM    Advised the patient to keep warm compress to the area. Take antibiotic as prescribed. Explained to the patient that the area may drain at some point and that is okay. Referral given for lipoma on head. Advised the patient that if the dermatologist does not take his insurance to let me know because I need referral for him to see a surgeon should be able to take care of this as well. Patient is to get some labs done. He will be contacted with results once they are back. I started the patient on lisinopril 40 mg this is to replace the amlodipine. I have asked the patient to return to the office in 1 month so we can recheck his blood pressure. Patient to get flu vaccine today. I have discussed the diagnosis with the patient and the intended plan as seen in the above orders. The patient has received an after-visit summary and questions were answered concerning future plans.      Medication Side Effects and Warnings were discussed with patient: yes  Patient Labs were reviewed and or requested: yes  Patient Past Records were reviewed and or requested  yes    Martha Chacon PA-C

## 2020-10-14 ENCOUNTER — OFFICE VISIT (OUTPATIENT)
Dept: SURGERY | Age: 50
End: 2020-10-14
Payer: MEDICAID

## 2020-10-14 VITALS
HEART RATE: 60 BPM | SYSTOLIC BLOOD PRESSURE: 132 MMHG | WEIGHT: 196 LBS | TEMPERATURE: 98.7 F | BODY MASS INDEX: 29.7 KG/M2 | OXYGEN SATURATION: 98 % | HEIGHT: 68 IN | RESPIRATION RATE: 18 BRPM | DIASTOLIC BLOOD PRESSURE: 81 MMHG

## 2020-10-14 DIAGNOSIS — L72.9 SCALP CYST: ICD-10-CM

## 2020-10-14 PROBLEM — Z72.0 TOBACCO ABUSE: Status: ACTIVE | Noted: 2020-10-14

## 2020-10-14 PROCEDURE — 99203 OFFICE O/P NEW LOW 30 MIN: CPT | Performed by: SURGERY

## 2020-10-14 NOTE — H&P (VIEW-ONLY)
Surgery History and Physical 
 
Subjective:  
  
Clint Castro is a 52 y.o. black male who presents for evaluation of a lump in his scalp. For the past few years, Mr. Georges Guzman has had a lump in the back of his scalp. The lump is now enlarging. He denies any pain or drainage. He has no personal or family h/o malignant soft tissue tumors. He had a benign lump removed from his scalp several years ago at [facility]. Past Medical History:  
Diagnosis Date  H/O chronic pancreatitis  Hypertension  Tobacco abuse Past Surgical History:  
Procedure Laterality Date  HX COLONOSCOPY    
 HX OTHER SURGICAL  2014 Excision of benign mass of head. Family History Problem Relation Age of Onset  Diabetes Mother  Hypertension Mother  Diabetes Sister  Diabetes Brother  Hypertension Brother Social History Tobacco Use  Smoking status: Current Every Day Smoker Packs/day: 0.25 Types: Cigarettes Start date: 2/6/2015  Smokeless tobacco: Never Used Substance Use Topics  Alcohol use: Yes Frequency: 4 or more times a week Drinks per session: 3 or 4 Binge frequency: Weekly Comment: wine Prior to Admission medications Medication Sig Start Date End Date Taking? Authorizing Provider  
lisinopriL (PRINIVIL, ZESTRIL) 40 mg tablet Take 1 Tab by mouth daily. 10/6/20  Yes Martha Chacon PA-C  
doxycycline (ADOXA) 100 mg tablet Take 1 Tab by mouth two (2) times a day. 10/6/20  Yes Cleo Chacon PA-C Allergies Allergen Reactions  Amlodipine Swelling Patient reports that he had swelling of his lower extremity  Pork Derived (Porcine) Swelling and Angioedema  Sulfa (Sulfonamide Antibiotics) Rash Review of Systems: A comprehensive review of systems was negative except for that written in the History of Present Illness.  
 
Objective:  
 
 Physical Exam: 
GENERAL: alert, cooperative, no distress, appears stated age, EYE: negative findings: anicteric sclera, LYMPHATIC: Cervical, supraclavicular nodes normal. , THROAT & NECK: normal, LUNG: clear to auscultation bilaterally, HEART: regular rate and rhythm, ABDOMEN: Soft, NT, ND., EXTREMITIES:  no edema, SKIN: Along the left occipital scalp, there is an approximately 2.5 x 2.5 cm soft, mobile, NT, well-circumscribed cystic mass. There is no erythema or drainage. ., NEUROLOGIC: negative, PSYCHIATRIC: non focal 
 
Assessment:  
 
Left occipital scalp cyst. 
 
Plan:  
 
Mr. Juan Manuel Adams would like to have the cyst removed soon and prefers a Wednesday. I discussed the risks of the procedure including bleeding, infection, wound healing problems, recurrent cyst, seroma, and reaction to the prep or local and general anesthetic. He understands the risks; any and all questions were answered to his satisfaction. Mr. Juan Manuel Adams will be scheduled for an elective outpatient excision of this scalp cyst under MAC with local anesthesia. The patient was counseled at length about the risks of donn Covid-19 during their perioperative period and any recovery window from their procedure. The patient was made aware that donn Covid-19  may worsen their prognosis for recovering from their procedure and lend to a higher morbidity and/or mortality risk. All material risks, benefits, and reasonable alternatives including postponing the procedure were discussed. The patient does wish to proceed with the procedure at this time.

## 2020-10-14 NOTE — PROGRESS NOTES
Surgery History and Physical    Subjective:      Giuliano Salamanca is a 52 y.o. black male who presents for evaluation of a lump in his scalp. For the past few years, MrAntoine Zhao has had a lump in the back of his scalp. The lump is now enlarging. He denies any pain or drainage. He has no personal or family h/o malignant soft tissue tumors. He had a benign lump removed from his scalp several years ago at [address] [place] [address]. Past Medical History:   Diagnosis Date    H/O chronic pancreatitis     Hypertension     Tobacco abuse      Past Surgical History:   Procedure Laterality Date    HX COLONOSCOPY      HX OTHER SURGICAL  2014    Excision of benign mass of head. Family History   Problem Relation Age of Onset    Diabetes Mother     Hypertension Mother     Diabetes Sister     Diabetes Brother     Hypertension Brother      Social History     Tobacco Use    Smoking status: Current Every Day Smoker     Packs/day: 0.25     Types: Cigarettes     Start date: 2/6/2015    Smokeless tobacco: Never Used   Substance Use Topics    Alcohol use: Yes     Frequency: 4 or more times a week     Drinks per session: 3 or 4     Binge frequency: Weekly     Comment: wine      Prior to Admission medications    Medication Sig Start Date End Date Taking? Authorizing Provider   lisinopriL (PRINIVIL, ZESTRIL) 40 mg tablet Take 1 Tab by mouth daily. 10/6/20  Yes [place], Martha D PA-C   doxycycline (ADOXA) 100 mg tablet Take 1 Tab by mouth two (2) times a day. 10/6/20  Yes [place], Martha D, PA-C      Allergies   Allergen Reactions    Amlodipine Swelling     Patient reports that he had swelling of his lower extremity    Pork Derived (Porcine) Swelling and Angioedema    Sulfa (Sulfonamide Antibiotics) Rash       Review of Systems:  A comprehensive review of systems was negative except for that written in the History of Present Illness.     Objective:      Physical Exam:  GENERAL: alert, cooperative, no distress, appears stated age, EYE: negative findings: anicteric sclera, LYMPHATIC: Cervical, supraclavicular nodes normal. , THROAT & NECK: normal, LUNG: clear to auscultation bilaterally, HEART: regular rate and rhythm, ABDOMEN: Soft, NT, ND., EXTREMITIES:  no edema, SKIN: Along the left occipital scalp, there is an approximately 2.5 x 2.5 cm soft, mobile, NT, well-circumscribed cystic mass. There is no erythema or drainage. ., NEUROLOGIC: negative, PSYCHIATRIC: non focal    Assessment:     Left occipital scalp cyst.    Plan:     Mr. Cliff Diaz would like to have the cyst removed soon and prefers a Wednesday. I discussed the risks of the procedure including bleeding, infection, wound healing problems, recurrent cyst, seroma, and reaction to the prep or local and general anesthetic. He understands the risks; any and all questions were answered to his satisfaction. Mr. Cliff Diaz will be scheduled for an elective outpatient excision of this scalp cyst under MAC with local anesthesia. The patient was counseled at length about the risks of donn Covid-19 during their perioperative period and any recovery window from their procedure. The patient was made aware that donn Covid-19  may worsen their prognosis for recovering from their procedure and lend to a higher morbidity and/or mortality risk. All material risks, benefits, and reasonable alternatives including postponing the procedure were discussed. The patient does wish to proceed with the procedure at this time.

## 2020-10-14 NOTE — PROGRESS NOTES
1. Have you been to the ER, urgent care clinic since your last visit? Hospitalized since your last visit? No    2. Have you seen or consulted any other health care providers outside of the [address] [place] [address] since your last visit? Include any pap smears or colon screening.  No

## 2020-10-17 ENCOUNTER — HOSPITAL ENCOUNTER (OUTPATIENT)
Dept: PREADMISSION TESTING | Age: 50
Discharge: HOME | End: 2020-10-17
Payer: MEDICAID

## 2020-10-17 PROCEDURE — 87635 SARS-COV-2 COVID-19 AMP PRB: CPT

## 2020-10-18 LAB — SARS-COV-2, COV2NT: NOT DETECTED

## 2020-10-20 ENCOUNTER — ANESTHESIA EVENT (OUTPATIENT)
Dept: SURGERY | Age: 50
End: 2020-10-20
Payer: MEDICAID

## 2020-10-20 RX ORDER — FENTANYL CITRATE 50 UG/ML
25 INJECTION, SOLUTION INTRAMUSCULAR; INTRAVENOUS
Status: CANCELLED | OUTPATIENT
Start: 2020-10-20

## 2020-10-20 RX ORDER — SODIUM CHLORIDE, SODIUM LACTATE, POTASSIUM CHLORIDE, CALCIUM CHLORIDE 600; 310; 30; 20 MG/100ML; MG/100ML; MG/100ML; MG/100ML
125 INJECTION, SOLUTION INTRAVENOUS CONTINUOUS
Status: CANCELLED | OUTPATIENT
Start: 2020-10-20

## 2020-10-20 RX ORDER — SODIUM CHLORIDE 0.9 % (FLUSH) 0.9 %
5-40 SYRINGE (ML) INJECTION AS NEEDED
Status: CANCELLED | OUTPATIENT
Start: 2020-10-20

## 2020-10-20 RX ORDER — ONDANSETRON 2 MG/ML
4 INJECTION INTRAMUSCULAR; INTRAVENOUS AS NEEDED
Status: CANCELLED | OUTPATIENT
Start: 2020-10-20

## 2020-10-20 RX ORDER — HYDROMORPHONE HYDROCHLORIDE 1 MG/ML
.25-1 INJECTION, SOLUTION INTRAMUSCULAR; INTRAVENOUS; SUBCUTANEOUS
Status: CANCELLED | OUTPATIENT
Start: 2020-10-20

## 2020-10-20 RX ORDER — ALBUTEROL SULFATE 0.83 MG/ML
2.5 SOLUTION RESPIRATORY (INHALATION) AS NEEDED
Status: CANCELLED | OUTPATIENT
Start: 2020-10-20

## 2020-10-20 RX ORDER — SODIUM CHLORIDE 0.9 % (FLUSH) 0.9 %
5-40 SYRINGE (ML) INJECTION EVERY 8 HOURS
Status: CANCELLED | OUTPATIENT
Start: 2020-10-20

## 2020-10-20 RX ORDER — DIPHENHYDRAMINE HYDROCHLORIDE 50 MG/ML
12.5 INJECTION, SOLUTION INTRAMUSCULAR; INTRAVENOUS AS NEEDED
Status: CANCELLED | OUTPATIENT
Start: 2020-10-20 | End: 2020-10-20

## 2020-10-21 ENCOUNTER — ANESTHESIA (OUTPATIENT)
Dept: SURGERY | Age: 50
End: 2020-10-21
Payer: MEDICAID

## 2020-10-21 ENCOUNTER — HOSPITAL ENCOUNTER (OUTPATIENT)
Age: 50
Setting detail: OUTPATIENT SURGERY
Discharge: HOME | End: 2020-10-21
Attending: SURGERY | Admitting: SURGERY
Payer: MEDICAID

## 2020-10-21 VITALS
BODY MASS INDEX: 29.7 KG/M2 | HEIGHT: 68 IN | WEIGHT: 195.99 LBS | HEART RATE: 47 BPM | OXYGEN SATURATION: 100 % | RESPIRATION RATE: 12 BRPM | SYSTOLIC BLOOD PRESSURE: 149 MMHG | DIASTOLIC BLOOD PRESSURE: 92 MMHG | TEMPERATURE: 97.6 F

## 2020-10-21 DIAGNOSIS — Z86.018 S/P EXCISION OF LIPOMA: Primary | ICD-10-CM

## 2020-10-21 DIAGNOSIS — Z98.890 S/P EXCISION OF LIPOMA: Primary | ICD-10-CM

## 2020-10-21 PROCEDURE — 76210000040 HC AMBSU PH I REC FIRST 0.5 HR: Performed by: SURGERY

## 2020-10-21 PROCEDURE — 77030002933 HC SUT MCRYL J&J -A: Performed by: SURGERY

## 2020-10-21 PROCEDURE — 77030018836 HC SOL IRR NACL ICUM -A: Performed by: SURGERY

## 2020-10-21 PROCEDURE — 77030040361 HC SLV COMPR DVT MDII -B

## 2020-10-21 PROCEDURE — 74011000250 HC RX REV CODE- 250: Performed by: SURGERY

## 2020-10-21 PROCEDURE — 76030000000 HC AMB SURG OR TIME 0.5 TO 1: Performed by: SURGERY

## 2020-10-21 PROCEDURE — 74011250636 HC RX REV CODE- 250/636: Performed by: NURSE ANESTHETIST, CERTIFIED REGISTERED

## 2020-10-21 PROCEDURE — 2709999900 HC NON-CHARGEABLE SUPPLY: Performed by: SURGERY

## 2020-10-21 PROCEDURE — 76210000050 HC AMBSU PH II REC 0.5 TO 1 HR: Performed by: SURGERY

## 2020-10-21 PROCEDURE — 74011250636 HC RX REV CODE- 250/636: Performed by: ANESTHESIOLOGY

## 2020-10-21 PROCEDURE — 88304 TISSUE EXAM BY PATHOLOGIST: CPT

## 2020-10-21 PROCEDURE — 77030040922 HC BLNKT HYPOTHRM STRY -A

## 2020-10-21 PROCEDURE — 76060000061 HC AMB SURG ANES 0.5 TO 1 HR: Performed by: SURGERY

## 2020-10-21 PROCEDURE — 74011000250 HC RX REV CODE- 250: Performed by: NURSE ANESTHETIST, CERTIFIED REGISTERED

## 2020-10-21 RX ORDER — HYDROCODONE BITARTRATE AND ACETAMINOPHEN 5; 325 MG/1; MG/1
1 TABLET ORAL
Qty: 4 TAB | Refills: 0 | Status: SHIPPED | OUTPATIENT
Start: 2020-10-21 | End: 2020-10-24

## 2020-10-21 RX ORDER — FLUMAZENIL 0.1 MG/ML
0.2 INJECTION INTRAVENOUS
Status: DISCONTINUED | OUTPATIENT
Start: 2020-10-21 | End: 2020-10-21 | Stop reason: HOSPADM

## 2020-10-21 RX ORDER — LIDOCAINE HYDROCHLORIDE 10 MG/ML
0.1 INJECTION, SOLUTION EPIDURAL; INFILTRATION; INTRACAUDAL; PERINEURAL AS NEEDED
Status: DISCONTINUED | OUTPATIENT
Start: 2020-10-21 | End: 2020-10-21 | Stop reason: HOSPADM

## 2020-10-21 RX ORDER — SODIUM CHLORIDE 0.9 % (FLUSH) 0.9 %
5-40 SYRINGE (ML) INJECTION AS NEEDED
Status: DISCONTINUED | OUTPATIENT
Start: 2020-10-21 | End: 2020-10-21 | Stop reason: HOSPADM

## 2020-10-21 RX ORDER — NALOXONE HYDROCHLORIDE 0.4 MG/ML
0.04 INJECTION, SOLUTION INTRAMUSCULAR; INTRAVENOUS; SUBCUTANEOUS
Status: DISCONTINUED | OUTPATIENT
Start: 2020-10-21 | End: 2020-10-21 | Stop reason: HOSPADM

## 2020-10-21 RX ORDER — SODIUM CHLORIDE, SODIUM LACTATE, POTASSIUM CHLORIDE, CALCIUM CHLORIDE 600; 310; 30; 20 MG/100ML; MG/100ML; MG/100ML; MG/100ML
125 INJECTION, SOLUTION INTRAVENOUS CONTINUOUS
Status: DISCONTINUED | OUTPATIENT
Start: 2020-10-21 | End: 2020-10-21 | Stop reason: HOSPADM

## 2020-10-21 RX ORDER — MIDAZOLAM HYDROCHLORIDE 1 MG/ML
INJECTION, SOLUTION INTRAMUSCULAR; INTRAVENOUS AS NEEDED
Status: DISCONTINUED | OUTPATIENT
Start: 2020-10-21 | End: 2020-10-21 | Stop reason: HOSPADM

## 2020-10-21 RX ORDER — LIDOCAINE HYDROCHLORIDE AND EPINEPHRINE 10; 10 MG/ML; UG/ML
INJECTION, SOLUTION INFILTRATION; PERINEURAL AS NEEDED
Status: DISCONTINUED | OUTPATIENT
Start: 2020-10-21 | End: 2020-10-21 | Stop reason: HOSPADM

## 2020-10-21 RX ORDER — SODIUM CHLORIDE 0.9 % (FLUSH) 0.9 %
5-40 SYRINGE (ML) INJECTION EVERY 8 HOURS
Status: DISCONTINUED | OUTPATIENT
Start: 2020-10-21 | End: 2020-10-21 | Stop reason: HOSPADM

## 2020-10-21 RX ORDER — FENTANYL CITRATE 50 UG/ML
INJECTION, SOLUTION INTRAMUSCULAR; INTRAVENOUS AS NEEDED
Status: DISCONTINUED | OUTPATIENT
Start: 2020-10-21 | End: 2020-10-21 | Stop reason: HOSPADM

## 2020-10-21 RX ORDER — PROPOFOL 10 MG/ML
INJECTION, EMULSION INTRAVENOUS AS NEEDED
Status: DISCONTINUED | OUTPATIENT
Start: 2020-10-21 | End: 2020-10-21 | Stop reason: HOSPADM

## 2020-10-21 RX ORDER — LIDOCAINE HYDROCHLORIDE 20 MG/ML
INJECTION, SOLUTION INFILTRATION; PERINEURAL AS NEEDED
Status: DISCONTINUED | OUTPATIENT
Start: 2020-10-21 | End: 2020-10-21 | Stop reason: HOSPADM

## 2020-10-21 RX ORDER — BUPIVACAINE HYDROCHLORIDE 5 MG/ML
INJECTION, SOLUTION EPIDURAL; INTRACAUDAL AS NEEDED
Status: DISCONTINUED | OUTPATIENT
Start: 2020-10-21 | End: 2020-10-21 | Stop reason: HOSPADM

## 2020-10-21 RX ORDER — KETOROLAC TROMETHAMINE 30 MG/ML
INJECTION, SOLUTION INTRAMUSCULAR; INTRAVENOUS AS NEEDED
Status: DISCONTINUED | OUTPATIENT
Start: 2020-10-21 | End: 2020-10-21 | Stop reason: HOSPADM

## 2020-10-21 RX ORDER — PROPOFOL 10 MG/ML
INJECTION, EMULSION INTRAVENOUS
Status: DISCONTINUED | OUTPATIENT
Start: 2020-10-21 | End: 2020-10-21 | Stop reason: HOSPADM

## 2020-10-21 RX ADMIN — LIDOCAINE HYDROCHLORIDE 60 MG: 20 INJECTION, SOLUTION INFILTRATION; PERINEURAL at 09:09

## 2020-10-21 RX ADMIN — PROPOFOL 20 MG: 10 INJECTION, EMULSION INTRAVENOUS at 09:26

## 2020-10-21 RX ADMIN — MIDAZOLAM HYDROCHLORIDE 2 MG: 2 INJECTION, SOLUTION INTRAMUSCULAR; INTRAVENOUS at 09:03

## 2020-10-21 RX ADMIN — PROPOFOL 100 MCG/KG/MIN: 10 INJECTION, EMULSION INTRAVENOUS at 09:10

## 2020-10-21 RX ADMIN — SODIUM CHLORIDE, SODIUM LACTATE, POTASSIUM CHLORIDE, AND CALCIUM CHLORIDE 125 ML/HR: 600; 310; 30; 20 INJECTION, SOLUTION INTRAVENOUS at 08:57

## 2020-10-21 RX ADMIN — KETOROLAC TROMETHAMINE 30 MG: 30 INJECTION INTRAMUSCULAR; INTRAVENOUS at 09:36

## 2020-10-21 RX ADMIN — FENTANYL CITRATE 50 MCG: 0.05 INJECTION, SOLUTION INTRAMUSCULAR; INTRAVENOUS at 09:25

## 2020-10-21 RX ADMIN — PROPOFOL 50 MG: 10 INJECTION, EMULSION INTRAVENOUS at 09:12

## 2020-10-21 RX ADMIN — FENTANYL CITRATE 50 MCG: 0.05 INJECTION, SOLUTION INTRAMUSCULAR; INTRAVENOUS at 09:07

## 2020-10-21 NOTE — OP NOTES
Operative Report        Scarlett Osborne    MRN:  [id number]    Date of Procedure:  10/21/2020    Surgeon:  Tracy Starks MD.     Assistant:   [facility]. Anesthesia:   1. MAC.  2. 1% Xylocaine with Epinephrine and 0.5% Marcaine. Preoperative Diagnosis:   LEFT POSTERIOR SCALP CYST. Postoperative Diagnosis:    LEFT POSTERIOR SCALP CYST. Procedure:  Excision of left posterior scalp cyst.     Indication:   Scarlett Osborne is a 52 yrs arnol male who presents with a cyst of the posterior scalp which he would like to have removed. Procedure in Detail:   The patient was seen preoperatively in the holding area. The risks, benefits, and expected outcome were discussed with the patient, and all questions were answered satisfactorily. The patient concurred with the proposed plan, giving informed consent. The cyst was identified by the patient and confirmed by me. The cyst was then marked. The patient was taken to the OR. The patient was identified as Scarlett Osborne, and the procedure verified as Procedure(s):  EXCISION OF SCALP CYST. The patient was placed on the OR table in the right lateral decubitus position. MAC anesthesia was administered and tolerated well. The patient's scalp was shaved and then prepped with Hibiclens and draped in the usual sterile fashion. A Time Out was performed, and the above information was confirmed. The cyst was palpated and remarked. The local anesthetic was infiltrated into the skin and subcutaneous tissue. A transverse elliptical incision was made directly over the most prominent portion of the cyst.  Dissection was taken down into the subcutaneous tissue with a #15 blade, and a cyst was discovered. The cyst was dissected out in its entirety with blunt dissection and cautery and passed off as a specimen. No other masses were palpated within the wound. Hemostasis was obtained within the wound as needed with cautery.   The wound was irrigated with saline. The subcutaneous tissue was approximated with interrupted 3-0 Vicryl. The skin was closed with 4-0 Nylon in interrupted horizontal mattress fashion. The wound was cleaned and dried, and a topical antibiotic ointment and a sterile dressing were applied. Estimated Blood Loss:    Less than 25 ml. Specimen:   ID Type Source Tests Collected by Time Destination   1 : Left scalp cyst  Preservative Head  Catherine Jean MD 10/21/2020 5661 Pathology        Drain:   * No implants in log *    Findings: An approximately 2.5 cm subcutaneous dark fluid-filled cyst of the left posterior scalp. Counts: All sponge, needle, and instrument counts were correct x 2. Complications:  None. Disposition:  The patient was transferred to the recovery room in stable condition, having tolerated the procedure and anesthesia well.                Signed By: Javier Lomax MD     October 21, 2020        Venancio Clifford PA-C

## 2020-10-21 NOTE — ANESTHESIA POSTPROCEDURE EVALUATION
Procedure(s):  EXCISION OF SCALP CYST.    MAC    Anesthesia Post Evaluation      Multimodal analgesia: multimodal analgesia not used between 6 hours prior to anesthesia start to PACU discharge  Patient location during evaluation: PACU  Patient participation: complete - patient participated  Level of consciousness: awake  Pain management: adequate  Airway patency: patent  Anesthetic complications: no  Cardiovascular status: acceptable, blood pressure returned to baseline and hemodynamically stable  Respiratory status: acceptable  Hydration status: acceptable  Post anesthesia nausea and vomiting:  controlled  Final Post Anesthesia Temperature Assessment:  Normothermia (36.0-37.5 degrees C)      INITIAL Post-op Vital signs:   Vitals Value Taken Time   /91 10/21/2020 10:15 AM   Temp     Pulse 44 10/21/2020 10:17 AM   Resp 17 10/21/2020 10:17 AM   SpO2 100 % 10/21/2020 10:17 AM   Vitals shown include unvalidated device data.

## 2020-10-21 NOTE — PROGRESS NOTES
Notified MD Dale Guerreromps that patients heartrate is sinus howard as low as 44.  Preoperatively patient was 62. md ferreira feels patient is ok for discahrge

## 2020-10-21 NOTE — ANESTHESIA PREPROCEDURE EVALUATION
Relevant Problems   No relevant active problems       Anesthetic History   No history of anesthetic complications            Review of Systems / Medical History  Patient summary reviewed and pertinent labs reviewed    Pulmonary          Smoker         Neuro/Psych   Within defined limits           Cardiovascular    Hypertension                   GI/Hepatic/Renal  Within defined limits              Endo/Other  Within defined limits           Other Findings              Physical Exam    Airway  Mallampati: II  TM Distance: 4 - 6 cm  Neck ROM: normal range of motion   Mouth opening: Normal     Cardiovascular  Regular rate and rhythm,  S1 and S2 normal,  no murmur, click, rub, or gallop  Rhythm: regular  Rate: normal         Dental  No notable dental hx       Pulmonary  Breath sounds clear to auscultation               Abdominal  GI exam deferred       Other Findings            Anesthetic Plan    ASA: 2  Anesthesia type: MAC          Induction: Intravenous  Anesthetic plan and risks discussed with: Patient

## 2020-10-21 NOTE — INTERVAL H&P NOTE
Update History & Physical 
 
The Patient's History and Physical of October 14, The plan was reviewed with the patient and I examined the patient. There was no change. The surgical site was confirmed by the patient and me. Plan:  The risk, benefits, expected outcome, and alternative to the recommended procedure have been discussed with the patient. Patient understands and wants to proceed with the procedure.  
 
Electronically signed by Lucia Medrano MD on 10/21/2020 at 8:43 AM

## 2020-10-21 NOTE — DISCHARGE INSTRUCTIONS
Instructions Following Excision of Cyst, Soft Tissue Mass    Activity:  · As tolerated. · May shower tomorrow. No bath for 2 weeks and until after seen by your doctor. Diet:  · Advance to a regular diet as tolerated. Pain:  · Take pain medication as directed by your doctor. · Call your doctor if pain is NOT relieved by medication. Dressing Care:   Remove your bandage on Thursday, 10/22. Clean the incision with a damp washcloth. Apply a topical antibiotic ointment daily. Keep the incision clean and dry. If there is any drainage, then cover the incision with a dry bandage. Change the bandage daily as needed. Follow-Up Phone Calls:  · Call will be made by my nursing staff tomorrow. · If you have any problems or concerns, call your doctor as needed. Call your doctor if you experience:  · Excessive bleeding that does not stop after holding mild pressure over the area. · Temperature of 101° Fahrenheit or above. · Redness, excessive swelling or bruising, and/or green or yellow, smelly discharge from incision. After Anesthesia:  · For the first 24 hours and while taking narcotic pain medication: DO NOT drive, drink alcoholic beverages, or make important decisions. · Be aware of dizziness following anesthesia and while taking pain medication. Jim Lomas. Juan Ann MD, [address] [facility] at [address] [facility] [address] [facility], [address] [place] [address]  [phone]  Fax [phone]        DISCHARGE SUMMARY from your Nurse      PATIENT INSTRUCTIONS    After general anesthesia or intravenous sedation, for 24 hours or while taking prescription Narcotics:  · Limit your activities  · Do not drive and operate hazardous machinery  · Do not make important personal or business decisions  · Do  not drink alcoholic beverages  · If you have not urinated within 8 hours after discharge, please contact your surgeon on call.     Report the following to your surgeon:  · Excessive pain, swelling, redness or odor of or around the surgical area  · Temperature over 100.5  · Nausea and vomiting lasting longer than 4 hours or if unable to take medications  · Any signs of decreased circulation or nerve impairment to extremity: change in color, persistent  numbness, tingling, coldness or increase pain  · Any questions      GOOD HELP TO FIGHT AN INFECTION  Here are a few tip to help reduce the chance of getting an infection after surgery:   Wash Your Hands   Good handwashing is the most important thing you and your caregiver can do.  Wash before and after caring for any wounds. Dry your hand with a clean towel.  Wash with soap and water for at least 20 seconds. A TIP: sing the \"Happy Birthday\" song through one time while washing to help with the timing.  Use a hand  in between washings.  Shower   When your surgeon says it is OK to take a shower, use a new bar of antibacterial soap (if that is what you use, and keep that bar of soap ONLY for your use), or antibacterial body wash.  Use a clean wash cloth or sponge when you bathe.  Dry off with a clean towel  after every bath - be careful around any wounds, skin staples, sutures or surgical glue over/on wounds.  Do not enter swimming pools, hot tubs, lakes, rivers and/or ocean until wounds are healed and your doctor/surgeon says it is OK.  Use Clean Sheets   Sleep on freshly laundered sheets after your surgery.  Keep the surgery site covered with a clean, dry bandage (if instructed to do so). If the bandage becomes soiled, reapply a new, dry, clean bandage.  Do not allow pets to sleep with you while your wound is healing.  Lifestyle Modification and Controlling Your Blood Sugar   Smoking slows wound healing. Stop smoking and limit exposure to second-hand smoke.  High blood sugar slows wound healing.   Eat a well-balanced diet to provide proper nutrition while healing   Monitor your blood sugar (if you are a diabetic) and take your medications as you are suppose to so you can control you blood sugar after surgery. COUGH AND DEEP BREATHE    Breathing deeply and coughing are very important exercises to do after surgery. Deep breathing and coughing open the little air tubes and air sacks in your lungs. You take deep breaths every day. You may not even notice - it is just something you do when you sigh or yawn. It is a natural exercise you do to keep these air passages open. After surgery, take deep breaths and cough, on purpose. DIRECTIONS:  · Take 10 to 15 slow deep breaths every hour while awake. · Breathe in deeply, and hold it for 2 seconds. · Exhale slowly through puckered lips, like blowing up a balloon. · After every 4th or 5th deep breath, hug your pillow to your chest or belly and give a hard, deep cough. Yes, it will probably hurt. But doing this exercise is a very important part of healing after surgery. Take your pain medicine to help you do this exercise without too much pain. Coughing and deep breathing help prevent bronchitis and pneumonia after surgery. If you had chest or belly surgery, use a pillow as a \"hug damián\" and hold it tightly to your chest or belly when you cough. ANKLE PUMPS    Ankle pumps increase the circulation of oxygenated blood to your lower extremities and decrease your risk for circulation problems such as blood clots. They also stretch the muscles, tendons and ligaments in your foot and ankle, and prevent joint contracture in the ankle and foot, especially after surgeries on the legs. It is important to do ankle pump exercises regularly after surgery because immobility increases your risk for developing a blood clot. Your doctor may also have you take an Aspirin for the next few days as well. If your doctor did not ask you to take an Aspirin, consult with him before starting Aspirin therapy on your own.     The exercise is quite simple. · Slowly point your foot forward, feeling the muscles on the top of your lower leg stretch, and hold this position for 5 seconds. · Next, pull your foot back toward you as far as possible, stretching the calf muscles, and hold that position for 5 seconds. · Repeat with the other foot. · Perform 10 repetitions every hour while awake for both ankles if possible (down and then up with the foot once is one repetition). You should feel gentle stretching of the muscles in your lower leg when doing this exercise. If you feel pain, or your range of motion is limited, don't push too hard. Only go the limit your joint and muscles will let you go. If you have increasing pain, progressively worsening leg warmth or swelling, STOP the exercise and call your doctor. MEDICATION AND   SIDE EFFECT GUIDE    The [facility] MEDICATION AND SIDE EFFECT GUIDE was provided to the PATIENT AND CARE PROVIDER. Information provided includes instruction about drug purpose and common side effects for the following medications:   · ***        These are general instructions for a healthy lifestyle:    *   Please give a list of your current medications to your Primary Care Provider. *   Please update this list whenever your medications are discontinued, doses are changed, or new medications (including over-the-counter products) are added. *   Please carry medication information at all times in case of emergency situations. About Smoking  No smoking / No tobacco products  Avoid exposure to second hand smoke     Surgeon General's Warning:  Quitting smoking now greatly reduces serious risk to your health. Obesity, smoking, and sedentary lifestyle greatly increases your risk for illness and disease. A healthy diet, regular physical exercise & weight monitoring are important for maintaining a healthy lifestyle.       Congestive Heart Failure  You may be retaining fluid if you have a history of heart failure or if you experience any of the following symptoms:  Weight gain of 3 pounds or more overnight or 5 pounds in a week, increased swelling in your hands or feet or shortness of breath while lying flat in bed. Please call your doctor as soon as you notice any of these symptoms; do not wait until your next office visit. Recognize signs and symptoms of STROKE:  F -  Face looks uneven  A -  Arms unable to move or move evenly  S -  Speech slurred or non-existent  T -  Time-call 911 as soon as signs and symptoms begin-DO NOT go          back to bed or wait to see if you get better-TIME IS BRAIN. Warning Signs of HEART ATTACK   Call 911 if you have these symptoms:     Chest discomfort. Most heart attacks involve discomfort in the center of the chest that lasts more than a few minutes, or that goes away and comes back. It can feel like uncomfortable pressure, squeezing, fullness, or pain.  Discomfort in other areas of the upper body. Symptoms can include pain or discomfort in one or both arms, the back, neck, jaw, or stomach.  Shortness of breath with or without chest discomfort.  Other signs may include breaking out in a cold sweat, nausea, or lightheadedness. Don't wait more than five minutes to call 911 - MINUTES MATTER! Fast action can save your life. Calling 911 is almost always the fastest way to get lifesaving treatment. Emergency Medical Services staff can begin treatment when they arrive -- up to an hour sooner than if someone gets to the hospital by car. Learning About Coronavirus [phone])  Coronavirus [phone]): Overview  What is coronavirus (COVID-19)? The coronavirus disease (COVID-19) is caused by a virus. It is an illness that was first found in [place], [place], in December 2019. It has since spread worldwide. The virus can cause fever, cough, and trouble breathing.  In severe cases, it can cause pneumonia and make it hard to breathe without help. It can cause death. Coronaviruses are a large group of viruses. They cause the common cold. They also cause more serious illnesses like [place] East respiratory syndrome (MERS) and severe acute respiratory syndrome (SARS). COVID-19 is caused by a novel coronavirus. That means it's a new type that has not been seen in people before. This virus spreads person-to-person through droplets from coughing and sneezing. It can also spread when you are close to someone who is infected. And it can spread when you touch something that has the virus on it, such as a doorknob or a tabletop. What can you do to protect yourself from coronavirus (COVID-19)? The best way to protect yourself from getting sick is to:  · Avoid areas where there is an outbreak. · Avoid contact with people who may be infected. · Wash your hands often with soap or alcohol-based hand sanitizers. · Avoid crowds and try to stay at least 6 feet away from other people. · Wash your hands often, especially after you cough or sneeze. Use soap and water, and scrub for at least 20 seconds. If soap and water aren't available, use an alcohol-based hand . · Avoid touching your mouth, nose, and eyes. What can you do to avoid spreading the virus to others? To help avoid spreading the virus to others:  · Cover your mouth with a tissue when you cough or sneeze. Then throw the tissue in the trash. · Use a disinfectant to clean things that you touch often. · Stay home if you are sick or have been exposed to the virus. Don't go to school, work, or public areas. And don't use public transportation. · If you are sick:  ? Leave your home only if you need to get medical care. But call the doctor's office first so they know you're coming. And wear a face mask, if you have one.  ? If you have a face mask, wear it whenever you're around other people. It can help stop the spread of the virus when you cough or sneeze. ?  Clean and disinfect your home every day. Use household  and disinfectant wipes or sprays. Take special care to clean things that you grab with your hands. These include doorknobs, remote controls, phones, and handles on your refrigerator and microwave. And don't forget countertops, tabletops, bathrooms, and computer keyboards. When to call for help  Call 911 anytime you think you may need emergency care. For example, call if:  · You have severe trouble breathing. (You can't talk at all.)  · You have constant chest pain or pressure. · You are severely dizzy or lightheaded. · You are confused or can't think clearly. · Your face and lips have a blue color. · You pass out (lose consciousness) or are very hard to wake up. Call your doctor now if you develop symptoms such as:  · Shortness of breath. · Fever. · Cough. If you need to get care, call ahead to the doctor's office for instructions before you go. Make sure you wear a face mask, if you have one, to prevent exposing other people to the virus. Where can you get the latest information? The following health organizations are tracking and studying this virus. Their websites contain the most up-to-date information. Nancy Contil also learn what to do if you think you may have been exposed to the virus. · U.S. Centers for Disease Control and Prevention (CDC): The CDC provides updated news about the disease and travel advice. The website also tells you how to prevent the spread of infection. www.cdc.gov  · World Health Organization [facility]): WHO offers information about the virus outbreaks. WHO also has travel advice. www.who.int  Current as of: April 1, 2020               Content Version: 12.4  © 8538-7305 [employer].    Care instructions adapted under license by your healthcare professional. If you have questions about a medical condition or this instruction, always ask your healthcare professional. Scotty Granda any warranty or liability for your use of this information. The discharge information has been reviewed with the {PATIENT PARENT GUARDIAN:10141}. Any questions and concerns from the {PATIENT PARENT GUARDIAN:69167} have been addressed. The {PATIENT PARENT GUARDIAN:96931} verbalized understanding. The following personal items collected during your admission are returned to you:   Dental Appliance: Dental Appliances: None  Vision: Visual Aid: None  Hearing Aid:    Jewelry: Jewelry: None  Clothing: Clothing: Other (comment)(street clothes to locker)  Other Valuables: Other Valuables: None  Valuables sent to safe:   DISCHARGE SUMMARY from your Nurse      PATIENT INSTRUCTIONS    After general anesthesia or intravenous sedation, for 24 hours or while taking prescription Narcotics:  · Limit your activities  · Do not drive and operate hazardous machinery  · Do not make important personal or business decisions  · Do  not drink alcoholic beverages  · If you have not urinated within 8 hours after discharge, please contact your surgeon on call. Report the following to your surgeon:  · Excessive pain, swelling, redness or odor of or around the surgical area  · Temperature over 100.5  · Nausea and vomiting lasting longer than 4 hours or if unable to take medications  · Any signs of decreased circulation or nerve impairment to extremity: change in color, persistent  numbness, tingling, coldness or increase pain  · Any questions      GOOD HELP TO FIGHT AN INFECTION  Here are a few tip to help reduce the chance of getting an infection after surgery:   Wash Your Hands   Good handwashing is the most important thing you and your caregiver can do.  Wash before and after caring for any wounds. Dry your hand with a clean towel.  Wash with soap and water for at least 20 seconds. A TIP: sing the \"Happy Birthday\" song through one time while washing to help with the timing.  Use a hand  in between washings.      Shower   When your surgeon says it is OK to take a shower, use a new bar of antibacterial soap (if that is what you use, and keep that bar of soap ONLY for your use), or antibacterial body wash.  Use a clean wash cloth or sponge when you bathe.  Dry off with a clean towel  after every bath - be careful around any wounds, skin staples, sutures or surgical glue over/on wounds.  Do not enter swimming pools, hot tubs, lakes, rivers and/or ocean until wounds are healed and your doctor/surgeon says it is OK.  Use Clean Sheets   Sleep on freshly laundered sheets after your surgery.  Keep the surgery site covered with a clean, dry bandage (if instructed to do so). If the bandage becomes soiled, reapply a new, dry, clean bandage.  Do not allow pets to sleep with you while your wound is healing.  Lifestyle Modification and Controlling Your Blood Sugar   Smoking slows wound healing. Stop smoking and limit exposure to second-hand smoke.  High blood sugar slows wound healing. Eat a well-balanced diet to provide proper nutrition while healing   Monitor your blood sugar (if you are a diabetic) and take your medications as you are suppose to so you can control you blood sugar after surgery. COUGH AND DEEP BREATHE    Breathing deeply and coughing are very important exercises to do after surgery. Deep breathing and coughing open the little air tubes and air sacks in your lungs. You take deep breaths every day. You may not even notice - it is just something you do when you sigh or yawn. It is a natural exercise you do to keep these air passages open. After surgery, take deep breaths and cough, on purpose. DIRECTIONS:  · Take 10 to 15 slow deep breaths every hour while awake. · Breathe in deeply, and hold it for 2 seconds. · Exhale slowly through puckered lips, like blowing up a balloon. · After every 4th or 5th deep breath, hug your pillow to your chest or belly and give a hard, deep cough.       Yes, it will probably hurt. But doing this exercise is a very important part of healing after surgery. Take your pain medicine to help you do this exercise without too much pain. Coughing and deep breathing help prevent bronchitis and pneumonia after surgery. If you had chest or belly surgery, use a pillow as a \"hug damián\" and hold it tightly to your chest or belly when you cough. ANKLE PUMPS    Ankle pumps increase the circulation of oxygenated blood to your lower extremities and decrease your risk for circulation problems such as blood clots. They also stretch the muscles, tendons and ligaments in your foot and ankle, and prevent joint contracture in the ankle and foot, especially after surgeries on the legs. It is important to do ankle pump exercises regularly after surgery because immobility increases your risk for developing a blood clot. Your doctor may also have you take an Aspirin for the next few days as well. If your doctor did not ask you to take an Aspirin, consult with him before starting Aspirin therapy on your own. The exercise is quite simple. · Slowly point your foot forward, feeling the muscles on the top of your lower leg stretch, and hold this position for 5 seconds. · Next, pull your foot back toward you as far as possible, stretching the calf muscles, and hold that position for 5 seconds. · Repeat with the other foot. · Perform 10 repetitions every hour while awake for both ankles if possible (down and then up with the foot once is one repetition). You should feel gentle stretching of the muscles in your lower leg when doing this exercise. If you feel pain, or your range of motion is limited, don't push too hard. Only go the limit your joint and muscles will let you go. If you have increasing pain, progressively worsening leg warmth or swelling, STOP the exercise and call your doctor.            MEDICATION AND   SIDE EFFECT GUIDE    The [facility] MEDICATION AND SIDE EFFECT GUIDE was provided to the PATIENT AND CARE PROVIDER. Information provided includes instruction about drug purpose and common side effects for the following medications:   · ***        These are general instructions for a healthy lifestyle:    *   Please give a list of your current medications to your Primary Care Provider. *   Please update this list whenever your medications are discontinued, doses are changed, or new medications (including over-the-counter products) are added. *   Please carry medication information at all times in case of emergency situations. About Smoking  No smoking / No tobacco products  Avoid exposure to second hand smoke     Surgeon General's Warning:  Quitting smoking now greatly reduces serious risk to your health. Obesity, smoking, and sedentary lifestyle greatly increases your risk for illness and disease. A healthy diet, regular physical exercise & weight monitoring are important for maintaining a healthy lifestyle. Congestive Heart Failure  You may be retaining fluid if you have a history of heart failure or if you experience any of the following symptoms:  Weight gain of 3 pounds or more overnight or 5 pounds in a week, increased swelling in your hands or feet or shortness of breath while lying flat in bed. Please call your doctor as soon as you notice any of these symptoms; do not wait until your next office visit. Recognize signs and symptoms of STROKE:  F -  Face looks uneven  A -  Arms unable to move or move evenly  S -  Speech slurred or non-existent  T -  Time-call 911 as soon as signs and symptoms begin-DO NOT go          back to bed or wait to see if you get better-TIME IS BRAIN. Warning Signs of HEART ATTACK   Call 911 if you have these symptoms:     Chest discomfort. Most heart attacks involve discomfort in the center of the chest that lasts more than a few minutes, or that goes away and comes back.  It can feel like uncomfortable pressure, squeezing, fullness, or pain.  Discomfort in other areas of the upper body. Symptoms can include pain or discomfort in one or both arms, the back, neck, jaw, or stomach.  Shortness of breath with or without chest discomfort.  Other signs may include breaking out in a cold sweat, nausea, or lightheadedness. Don't wait more than five minutes to call 911 - MINUTES MATTER! Fast action can save your life. Calling 911 is almost always the fastest way to get lifesaving treatment. Emergency Medical Services staff can begin treatment when they arrive -- up to an hour sooner than if someone gets to the hospital by car. Learning About Coronavirus [phone])  Coronavirus [phone]): Overview  What is coronavirus (COVID-19)? The coronavirus disease (COVID-19) is caused by a virus. It is an illness that was first found in [place], [place], in December 2019. It has since spread worldwide. The virus can cause fever, cough, and trouble breathing. In severe cases, it can cause pneumonia and make it hard to breathe without help. It can cause death. Coronaviruses are a large group of viruses. They cause the common cold. They also cause more serious illnesses like [place] respiratory syndrome (MERS) and severe acute respiratory syndrome (SARS). COVID-19 is caused by a novel coronavirus. That means it's a new type that has not been seen in people before. This virus spreads person-to-person through droplets from coughing and sneezing. It can also spread when you are close to someone who is infected. And it can spread when you touch something that has the virus on it, such as a doorknob or a tabletop. What can you do to protect yourself from coronavirus (COVID-19)? The best way to protect yourself from getting sick is to:  · Avoid areas where there is an outbreak. · Avoid contact with people who may be infected. · Wash your hands often with soap or alcohol-based hand sanitizers.   · Avoid crowds and try to stay at least 6 feet away from other people. · Wash your hands often, especially after you cough or sneeze. Use soap and water, and scrub for at least 20 seconds. If soap and water aren't available, use an alcohol-based hand . · Avoid touching your mouth, nose, and eyes. What can you do to avoid spreading the virus to others? To help avoid spreading the virus to others:  · Cover your mouth with a tissue when you cough or sneeze. Then throw the tissue in the trash. · Use a disinfectant to clean things that you touch often. · Stay home if you are sick or have been exposed to the virus. Don't go to school, work, or public areas. And don't use public transportation. · If you are sick:  ? Leave your home only if you need to get medical care. But call the doctor's office first so they know you're coming. And wear a face mask, if you have one.  ? If you have a face mask, wear it whenever you're around other people. It can help stop the spread of the virus when you cough or sneeze. ? Clean and disinfect your home every day. Use household  and disinfectant wipes or sprays. Take special care to clean things that you grab with your hands. These include doorknobs, remote controls, phones, and handles on your refrigerator and microwave. And don't forget countertops, tabletops, bathrooms, and computer keyboards. When to call for help  Call 911 anytime you think you may need emergency care. For example, call if:  · You have severe trouble breathing. (You can't talk at all.)  · You have constant chest pain or pressure. · You are severely dizzy or lightheaded. · You are confused or can't think clearly. · Your face and lips have a blue color. · You pass out (lose consciousness) or are very hard to wake up. Call your doctor now if you develop symptoms such as:  · Shortness of breath. · Fever. · Cough.   If you need to get care, call ahead to the doctor's office for instructions before you go. Make sure you wear a face mask, if you have one, to prevent exposing other people to the virus. Where can you get the latest information? The following health organizations are tracking and studying this virus. Their websites contain the most up-to-date information. Charlee Sharp also learn what to do if you think you may have been exposed to the virus. · U.S. Centers for Disease Control and Prevention (CDC): The CDC provides updated news about the disease and travel advice. The website also tells you how to prevent the spread of infection. www.cdc.gov  · World Health Organization [facility]): WHO offers information about the virus outbreaks. WHO also has travel advice. www.who.int  Current as of: April 1, 2020               Content Version: 12.4  © 2006-2020 Health[employer]orporated. Care instructions adapted under license by your healthcare professional. If you have questions about a medical condition or this instruction, always ask your healthcare professional. [place] [address] any warranty or liability for your use of this information. The discharge information has been reviewed with the {PATIENT PARENT GUARDIAN:10766}. Any questions and concerns from the {PATIENT PARENT GUARDIAN:50648} have been addressed. The {PATIENT PARENT GUARDIAN:20646} verbalized understanding. The following personal items collected during your admission are returned to you:   Dental Appliance: Dental Appliances: None  Vision: Visual Aid: None  Hearing Aid:    Jewelry: Jewelry: None  Clothing: Clothing: Other (comment)(street clothes to locker)  Other Valuables:  Other Valuables: None  Valuables sent to safe:

## 2020-10-22 ENCOUNTER — TELEPHONE (OUTPATIENT)
Dept: SURGERY | Age: 50
End: 2020-10-22

## 2020-10-22 NOTE — TELEPHONE ENCOUNTER
How are you doing:Alright    Are you having any pain: Yes ___X___           No ______  If yes level:4/10    Are you taking pain meds:Yes OTC Ibuprofen        If yes- recommended any OTC constipation treatment if needed    Have you had any nausea or vomiting: Yes _______           No X_______    How is your appetite (eating & drinking):Good    Have you moved your bowels since surgery: Yes X______       No ______    Any issues with urination: Yes _____        No ___X___    Pt notified to take dressing off after 48 hrs:   Yes ___X____        No ______    Do they have a drain:  Yes ______        No ___X___    Are they keeping track of output: Yes ______        No ______    Did they review their discharge instructions:  Yes __X____         No ______    Any other concerns:No    Your follow up office appt is:11/4/20

## 2020-11-04 ENCOUNTER — OFFICE VISIT (OUTPATIENT)
Dept: SURGERY | Age: 50
End: 2020-11-04
Payer: MEDICAID

## 2020-11-04 VITALS
TEMPERATURE: 98.8 F | OXYGEN SATURATION: 97 % | RESPIRATION RATE: 18 BRPM | HEIGHT: 68 IN | SYSTOLIC BLOOD PRESSURE: 140 MMHG | WEIGHT: 199.5 LBS | DIASTOLIC BLOOD PRESSURE: 80 MMHG | HEART RATE: 60 BPM | BODY MASS INDEX: 30.23 KG/M2

## 2020-11-04 DIAGNOSIS — Z48.89 ENCOUNTER FOR POSTOPERATIVE WOUND CHECK: ICD-10-CM

## 2020-11-04 PROBLEM — L72.9 SCALP CYST: Status: RESOLVED | Noted: 2020-10-14 | Resolved: 2020-11-04

## 2020-11-04 PROBLEM — D23.9: Status: ACTIVE | Noted: 2020-11-04

## 2020-11-04 PROCEDURE — 99024 POSTOP FOLLOW-UP VISIT: CPT | Performed by: SURGERY

## 2020-11-04 NOTE — PROGRESS NOTES
1. Have you been to the ER, urgent care clinic since your last visit? Hospitalized since your last visit? No  2. Have you seen or consulted any other health care providers outside of the [address] [place] [address] since your last visit? Include any pap smears or colon screening.  No

## 2020-11-04 NOTE — PROGRESS NOTES
Subjective:      Dez Thompson is a 52 y.o. black male presents for postop care 2 weeks following excision of a scalp cyst.  Mr. Jennifer Hinojosa is doing fine. Objective:     Visit Vitals  BP (!) 140/80 (BP 1 Location: Left arm, BP Patient Position: Sitting)   Pulse 60   Temp 98.8 °F (37.1 °C) (Oral)   Resp 18   Ht 5' 8\" (1.727 m)   Wt 199 lb 8 oz (90.5 kg)   SpO2 97%   BMI 30.33 kg/m²       General:  alert, cooperative, no distress   Left posterior scalp: The incision is clean, dry, and intact with no erythema. 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 Plan:     The pathology report was discussed with Mr. Jennifer Hinojosa.  His sutures were removed in the office today, and his incision looks fine. He can f/u with me prn.

## 2020-11-10 ENCOUNTER — OFFICE VISIT (OUTPATIENT)
Dept: INTERNAL MEDICINE CLINIC | Age: 50
End: 2020-11-10
Payer: MEDICAID

## 2020-11-10 VITALS
TEMPERATURE: 95.2 F | SYSTOLIC BLOOD PRESSURE: 154 MMHG | OXYGEN SATURATION: 98 % | BODY MASS INDEX: 29.19 KG/M2 | RESPIRATION RATE: 17 BRPM | DIASTOLIC BLOOD PRESSURE: 94 MMHG | WEIGHT: 192 LBS | HEART RATE: 60 BPM

## 2020-11-10 DIAGNOSIS — R10.32 LEFT LOWER QUADRANT PAIN: ICD-10-CM

## 2020-11-10 DIAGNOSIS — I10 ESSENTIAL HYPERTENSION: Primary | ICD-10-CM

## 2020-11-10 PROCEDURE — 99214 OFFICE O/P EST MOD 30 MIN: CPT | Performed by: PHYSICIAN ASSISTANT

## 2020-11-10 RX ORDER — METRONIDAZOLE 500 MG/1
500 TABLET ORAL 3 TIMES DAILY
Qty: 21 TAB | Refills: 0 | Status: SHIPPED | OUTPATIENT
Start: 2020-11-10 | End: 2021-05-03 | Stop reason: ALTCHOICE

## 2020-11-10 RX ORDER — HYDROCHLOROTHIAZIDE 25 MG/1
25 TABLET ORAL DAILY
Qty: 30 TAB | Refills: 1 | Status: SHIPPED | OUTPATIENT
Start: 2020-11-10 | End: 2021-01-21 | Stop reason: SDUPTHER

## 2020-11-10 RX ORDER — CIPROFLOXACIN 500 MG/1
500 TABLET ORAL 2 TIMES DAILY
Qty: 14 TAB | Refills: 0 | Status: SHIPPED | OUTPATIENT
Start: 2020-11-10 | End: 2021-05-03 | Stop reason: ALTCHOICE

## 2020-11-10 NOTE — PROGRESS NOTES
1. Have you been to the ER, urgent care clinic since your last visit? Hospitalized since your last visit?no    2. Have you seen or consulted any other health care providers outside of the [address] [place] [address] since your last visit? Include any pap smears or colon screening.  Yes      Chief Complaint   Patient presents with    Hypertension     follow up

## 2020-11-10 NOTE — PROGRESS NOTES
Chief Complaint   Patient presents with    Hypertension     follow up     he is a 52y.o. year old male who presents for evaluation. Presents today for follow-up of his blood pressure. He reports he has been taking his lisinopril as prescribed. The patient denies any problems with the lisinopril. Patient reports he has not been checking his blood pressure at home. He reports that he had a surgery done to remove the cyst.  He reports that he has seen the surgeon and been released. Patient would like for me to take a look at the area today because he reports he noticed some drainage on his pillow. He reports that he had his girlfriend to look at the area and she has not been able to identify any drainage. Lastly the patient reports that he has been having problems with his stomach. He reports from time to time he has frequent bouts of going to the bathroom. He reports that he did see the gastro doctor about a month or so ago and had a colonoscopy. Patient reports that he was told the colonoscopy was negative. The patient reports that the stool can range in consistency. He reports when these bouts happen he has pain along the left lower side. Reviewed PmHx, RxHx, FmHx, SocHx, AllgHx and updated and dated in the chart. Review of Systems - negative except as listed above    Objective:     Vitals:    11/10/20 1108 11/10/20 1155 11/10/20 1510   BP: (!) 157/98 (!) 154/94 (!) 154/94   Pulse: 60     Resp: 17     Temp: (!) 95.2 °F (35.1 °C)     TempSrc: Temporal     SpO2: 98%     Weight: 192 lb (87.1 kg)       Physical Examination: General appearance - alert, well appearing, and in no distress  Mental status - normal mood, behavior, speech, dress, motor activity, and thought processes  Chest - clear to auscultation, no wheezes, rales or rhonchi, symmetric air entry  Heart - normal rate and regular rhythm, S1 and S2 normal  Skin -well-healed surgical scar noted of the scalp.   No tenderness noted with palpation. No drainage or erythema noted. Assessment/ Plan:   Diagnoses and all orders for this visit:    1. Essential hypertension  -     hydroCHLOROthiazide (HYDRODIURIL) 25 mg tablet; Take 1 Tab by mouth daily. 2. Left lower quadrant pain  -     metroNIDAZOLE (FLAGYL) 500 mg tablet; Take 1 Tab by mouth three (3) times daily. -     ciprofloxacin HCl (CIPRO) 500 mg tablet; Take 1 Tab by mouth two (2) times a day. Patient blood pressure still not at goal.  I like for the patient to start on hydrochlorothiazide. I explained to patient that the surgical scar appears well-healed and currently I do not see any drainage. Advised him that he does not need to put Neosporin on the area. I advised the patient that I would contact the gastroenterologist office to get the report. I asked if he had a history of diverticulum but he states he does not know. Addendum  I was able to get the colonoscopy report from his gastroenterologist.  The patient does have diverticulum of the ascending and descending colon. At the time of the colonoscopy no inflammation was seen in this area. I will contact the patient and advised him that I would not start him on Flagyl and Cipro. I have also made the patient an appointment with Mr. David Arshad nurse practitioner for this Thursday, November 12 at 8:30 AM.  The patient will follow up here to recheck his blood pressure in a couple of weeks. Total encounter time was 25 minutes; over 50% of the time was spent counseling and coordinating care regarding hypertension, and left lower quadrant pain. I have discussed the diagnosis with the patient and the intended plan as seen in the above orders. The patient has received an after-visit summary and questions were answered concerning future plans.      Medication Side Effects and Warnings were discussed with patient: yes  Patient Labs were reviewed and or requested: yes  Patient Past Records were reviewed and or requested yes    Amaury Chacon PA-C

## 2021-01-21 DIAGNOSIS — I10 ESSENTIAL HYPERTENSION: ICD-10-CM

## 2021-01-21 RX ORDER — HYDROCHLOROTHIAZIDE 25 MG/1
25 TABLET ORAL DAILY
Qty: 30 TAB | Refills: 1 | Status: SHIPPED | OUTPATIENT
Start: 2021-01-21 | End: 2021-03-22

## 2021-03-22 DIAGNOSIS — I10 ESSENTIAL HYPERTENSION: ICD-10-CM

## 2021-03-22 RX ORDER — HYDROCHLOROTHIAZIDE 25 MG/1
TABLET ORAL
Qty: 30 TAB | Refills: 1 | Status: SHIPPED | OUTPATIENT
Start: 2021-03-22 | End: 2021-05-05 | Stop reason: SDUPTHER

## 2021-03-22 RX ORDER — LISINOPRIL 40 MG/1
TABLET ORAL
Qty: 30 TAB | Refills: 1 | Status: SHIPPED | OUTPATIENT
Start: 2021-03-22 | End: 2021-05-05 | Stop reason: SDUPTHER

## 2021-04-23 ENCOUNTER — TELEPHONE (OUTPATIENT)
Dept: INTERNAL MEDICINE CLINIC | Age: 51
End: 2021-04-23

## 2021-05-03 ENCOUNTER — OFFICE VISIT (OUTPATIENT)
Dept: INTERNAL MEDICINE CLINIC | Age: 51
End: 2021-05-03
Payer: MEDICAID

## 2021-05-03 VITALS
OXYGEN SATURATION: 97 % | DIASTOLIC BLOOD PRESSURE: 70 MMHG | HEIGHT: 68 IN | TEMPERATURE: 98 F | BODY MASS INDEX: 27.43 KG/M2 | WEIGHT: 181 LBS | SYSTOLIC BLOOD PRESSURE: 111 MMHG | HEART RATE: 60 BPM | RESPIRATION RATE: 20 BRPM

## 2021-05-03 DIAGNOSIS — Z12.5 SCREENING FOR PROSTATE CANCER: ICD-10-CM

## 2021-05-03 DIAGNOSIS — N52.9 ERECTILE DYSFUNCTION, UNSPECIFIED ERECTILE DYSFUNCTION TYPE: ICD-10-CM

## 2021-05-03 DIAGNOSIS — I10 ESSENTIAL HYPERTENSION: Primary | ICD-10-CM

## 2021-05-03 DIAGNOSIS — Z11.59 NEED FOR HEPATITIS C SCREENING TEST: ICD-10-CM

## 2021-05-03 DIAGNOSIS — Z13.220 SCREENING CHOLESTEROL LEVEL: ICD-10-CM

## 2021-05-03 DIAGNOSIS — R73.03 PREDIABETES: ICD-10-CM

## 2021-05-03 PROCEDURE — 99214 OFFICE O/P EST MOD 30 MIN: CPT | Performed by: PHYSICIAN ASSISTANT

## 2021-05-03 NOTE — PROGRESS NOTES
Subjective:     Randall Walden is a 52 y.o. male who presents for follow up of hypertension. Diet and Lifestyle: smoker 4 cigarettes daily  Home BP Monitoring: is not measured at home    Cardiovascular ROS: taking medications as instructed, no medication side effects noted, no TIA's, no chest pain on exertion, no dyspnea on exertion, no swelling of ankles. New concerns: patient reports he has been having problems with ED for the past month or so. He reports he took a friends viagra but it made his chest feel funny. He states he is able to get an erection but he can't keep it. Current Outpatient Medications   Medication Sig Dispense Refill    hydroCHLOROthiazide (HYDRODIURIL) 25 mg tablet TAKE 1 TABLET BY MOUTH EVERY DAY 90 Tab 1    lisinopriL (PRINIVIL, ZESTRIL) 40 mg tablet TAKE 1 TABLET BY MOUTH EVERY DAY 90 Tab 1    lipase/protease/amylase (PANCRELIPASE PO) Take 1 Tab by mouth after meals as needed. Allergies   Allergen Reactions    Amlodipine Swelling     Patient reports that he had swelling of his lower extremity    Pork Derived (Porcine) Swelling and Angioedema    Sulfa (Sulfonamide Antibiotics) Rash     Past Medical History:   Diagnosis Date    H/O chronic pancreatitis     Hypertension     Tobacco abuse              Review of Systems, additional:  Pertinent items are noted in HPI. Objective:     Visit Vitals  /70   Pulse 60   Temp 98 °F (36.7 °C) (Tympanic)   Resp 20   Ht 5' 8\" (1.727 m)   Wt 181 lb (82.1 kg)   SpO2 97%   BMI 27.52 kg/m²     Appearance: alert, well appearing, and in no distress. General exam: CVS exam BP noted to be well controlled today in office, S1, S2 normal, no gallop, no murmur, chest clear, no edema. Lab review: orders written for new lab studies as appropriate; see orders. Assessment/Plan:     hypertension well controlled. current treatment plan is effective, no change in therapy. Diagnoses and all orders for this visit:    1.  Essential hypertension  -     METABOLIC PANEL, COMPREHENSIVE; Future  -     hydroCHLOROthiazide (HYDRODIURIL) 25 mg tablet; TAKE 1 TABLET BY MOUTH EVERY DAY  -     lisinopriL (PRINIVIL, ZESTRIL) 40 mg tablet; TAKE 1 TABLET BY MOUTH EVERY DAY    2. Prediabetes  -     METABOLIC PANEL, COMPREHENSIVE; Future  -     HEMOGLOBIN A1C WITH EAG; Future    3. Erectile dysfunction, unspecified erectile dysfunction type  -     TSH 3RD GENERATION; Future  -     CBC WITH AUTOMATED DIFF; Future  -     LIPID PANEL; Future  -     PSA, DIAGNOSTIC (PROSTATE SPECIFIC AG); Future  -     TESTOSTERONE, FREE & TOTAL; Future    4. Screening cholesterol level    5. Need for hepatitis C screening test  -     HEPATITIS C AB; Future    6. Screening for prostate cancer    patient to get labs done. We talked about ED and the causes. He will be contact with the results. Advised the patient to keep working on stopping the smoking. He will be contact with the results of his labs once back. Will further discuss options for treating ED.

## 2021-05-05 ENCOUNTER — TELEPHONE (OUTPATIENT)
Dept: INTERNAL MEDICINE CLINIC | Age: 51
End: 2021-05-05

## 2021-05-05 RX ORDER — HYDROCHLOROTHIAZIDE 25 MG/1
TABLET ORAL
Qty: 90 TAB | Refills: 1 | Status: SHIPPED | OUTPATIENT
Start: 2021-05-05 | End: 2021-11-29

## 2021-05-05 RX ORDER — LISINOPRIL 40 MG/1
TABLET ORAL
Qty: 90 TAB | Refills: 1 | Status: SHIPPED | OUTPATIENT
Start: 2021-05-05 | End: 2021-11-26

## 2021-05-05 NOTE — TELEPHONE ENCOUNTER
Patient return call     Caller's first and last name and relationship (if not the patient): N/A       Best contact number(s):[phone]       Whose call is being returned:  The nurse regarding lab results     [facility]

## 2021-05-10 ENCOUNTER — VIRTUAL VISIT (OUTPATIENT)
Dept: INTERNAL MEDICINE CLINIC | Age: 51
End: 2021-05-10
Payer: MEDICAID

## 2021-05-10 DIAGNOSIS — E11.9 NEW ONSET TYPE 2 DIABETES MELLITUS (HCC): Primary | ICD-10-CM

## 2021-05-10 DIAGNOSIS — I10 ESSENTIAL HYPERTENSION: ICD-10-CM

## 2021-05-10 PROCEDURE — 99213 OFFICE O/P EST LOW 20 MIN: CPT | Performed by: PHYSICIAN ASSISTANT

## 2021-05-10 NOTE — PROGRESS NOTES
Kezia Mcdaniel is a 52 y.o. male who was seen by synchronous (real-time) audio-video technology on 5/10/2021 for Labs        Assessment & Plan:   Diagnoses and all orders for this visit:    1. New onset type 2 diabetes mellitus ([facility] [address]    2. Essential hypertension    The patient I spoke at length about diabetes today. I explained to him that at this point we could try managing his diabetes with diet and exercise. I would like for him to follow-up with the diabetes specialist to get education about foods that he should avoid and about the progression of the disease. I would like the patient to follow-up here in about 3 months for recheck. I spent at least 20 minutes on this visit with this established patient. [id number]  Subjective:   Patient presents today to discuss most recent labs. Patient's hemoglobin A1c came back in the range of diabetes. His triglycerides was also slightly elevated. Patient shared he has a strong family history of diabetes. Prior to Admission medications    Medication Sig Start Date End Date Taking? Authorizing Provider   hydroCHLOROthiazide (HYDRODIURIL) 25 mg tablet TAKE 1 TABLET BY MOUTH EVERY DAY 5/5/21  Yes Martha Chacon PA-C   lisinopriL (PRINIVIL, ZESTRIL) 40 mg tablet TAKE 1 TABLET BY MOUTH EVERY DAY 5/5/21  Yes Martha Chacon PA-C   lipase/protease/amylase (PANCRELIPASE PO) Take 1 Tab by mouth after meals as needed. Yes Provider, Historical     Patient Active Problem List    Diagnosis Date Noted    Apocrine hidrocystoma 11/04/2020    Tobacco abuse 10/14/2020    Hypertension 06/07/2016    Chronic pancreatitis ([facility] 06/07/2016     Current Outpatient Medications   Medication Sig Dispense Refill    hydroCHLOROthiazide (HYDRODIURIL) 25 mg tablet TAKE 1 TABLET BY MOUTH EVERY DAY 90 Tab 1    lisinopriL (PRINIVIL, ZESTRIL) 40 mg tablet TAKE 1 TABLET BY MOUTH EVERY DAY 90 Tab 1    lipase/protease/amylase (PANCRELIPASE PO) Take 1 Tab by mouth after meals as needed. Allergies   Allergen Reactions    Amlodipine Swelling     Patient reports that he had swelling of his lower extremity    Pork Derived (Porcine) Swelling and Angioedema    Sulfa (Sulfonamide Antibiotics) Rash       ROS  See above  Objective:   No flowsheet data found. General: alert, cooperative, no distress   Mental  status: normal mood, behavior, speech, dress, motor activity, and thought processes, able to follow commands   HENT: NCAT   Neck: no visualized mass   Resp: no respiratory distress   Neuro: no gross deficits   Skin: no discoloration or lesions of concern on visible areas   Psychiatric: normal affect, consistent with stated mood, no evidence of hallucinations     Additional exam findings: We discussed the expected course, resolution and complications of the diagnosis(es) in detail. Medication risks, benefits, costs, interactions, and alternatives were discussed as indicated. I advised him to contact the office if his condition worsens, changes or fails to improve as anticipated. He expressed understanding with the diagnosis(es) and plan. Alexy James, was evaluated through a synchronous (real-time) audio-video encounter. The patient (or guardian if applicable) is aware that this is a billable service. Verbal consent to proceed has been obtained within the past 12 months. The visit was conducted pursuant to the emergency declaration under the [address], [address] [place] [address] authority and the "[employer] and [employer] General Act. Patient identification was verified, and a caregiver was present when appropriate. The patient was located in a state where the provider was credentialed to provide care.     Shona Chacon PA-C

## 2021-05-26 ENCOUNTER — CLINICAL SUPPORT (OUTPATIENT)
Dept: DIABETES SERVICES | Age: 51
End: 2021-05-26
Payer: MEDICAID

## 2021-05-26 DIAGNOSIS — E11.9 NEW ONSET TYPE 2 DIABETES MELLITUS (HCC): ICD-10-CM

## 2021-05-26 PROCEDURE — G0108 DIAB MANAGE TRN  PER INDIV: HCPCS | Performed by: DIETITIAN, REGISTERED

## 2021-05-26 NOTE — PROGRESS NOTES
[address] [facility]  Diabetes Self-Management Education & Support Program  Pre-program Assessment    Reason for Referral: new dx  Referral Source: Elsy Baires PA-C  Services requested: DSMES    ASSESSMENT    From my perspective, the participant would benefit from [facility] specifically related to Reducing risks, Healthy eating, Physical activity, Healthy coping and Problem solving. Will adapt DSMES program to build on participant's skills score as noted in the Diabetes Skills, Confidence, and Preparedness Index. During the program, we will focus on providing DSMES that specifically addresses participant's interest in Healthy eating, as shown by their reported readiness to change. The participant would be best served by attending weekly individual sessions due to work schedule- not available for classes    Diabetes Self-Management Education Follow-up Visit: 6/2/21       Clinical Presentation  Beny Tobar is a 52 y.o.  male referred for diabetes self-management education. Participant has Type 2 DM not on insulin for <1 year. Family history positive for diabetes. Patient reports not receiving DSMES services in the past.     Most recent A1c value:   Lab Results   Component Value Date/Time    Hemoglobin A1c 6.8 (H) 05/03/2021 09:35 AM           Diabetes-related medications:  Current dosing:   Key Antihyperglycemic Medications     Patient is on no antihyperglycemic meds. Blood Pressure Management  Key ACE/ARB Medications             lisinopriL (PRINIVIL, ZESTRIL) 40 mg tablet TAKE 1 TABLET BY MOUTH EVERY DAY          Lipid Management  Key Antihyperlipidemia Meds     The patient is on no antihyperlipidemia meds. Clot Prevention  Key Anti-Platelet Anticoagulant Meds     The patient is on no antiplatelet meds or anticoagulants.           Learning Assessment  Learning objectives Educator assessment (5/26/2021)   Diabetes Disease Process  The participant can A) describe diabetes in basic terms;   B) state the type of diabetes they have; &   C) state accepted blood glucose targets. Healthy Eating  The participant can   A) identify carbohydrate foods; &   B) accurately read food labels. Being Active  The participant can  A) state the benefits of physical activity;  B) report their current PA practices;  C) identify PA they would consider incorporating in their lives; &  D) develop an implementation plan. Monitoring  The participant can  A) operate their blood glucose meter; &  B) describe how they log their blood glucoses to share with their provider. Taking Medications  The participant can  A) name their diabetes medications;  B) state the purpose and dose;  C) note side effects; &  D) describe proper storage, disposal & transport (if appropriate). Healthy Coping  The participant can    A) describe their response to diabetes diagnosis; B) describe their specific coping mechanisms;  C) identify supportive people and/or other resources that positively support their diabetes self-care and health. Reducing Risks  The participant can describe the preventive measures used by providers to promote health and prevent diabetes complications. Problem Solving  The participant can   A) identify signs, symptoms & treatment of hypoglycemia;   B) identify signs, symptoms & treatment of hyperglycemia;  C) describe their sick day plan; &  D) identify BG patterns to discuss with their provider.        No  No  No        Yes, some but not all  No, only reading for his food allergy to pork        No  Yes  Yes  No        No, has not been asked to check BS at this time  No      pt is not on diabetes meds            Yes  Yes  Yes        No          No  No  No  No     Characteristics to Learning   Barriers to Learning   [] Cognitive loss  [] Mental retardation   [] Intellectual delay/cognitive impairment  [] Psychiatric disorder  [] Visually impaired  [] Hearing loss [] Low literacy (difficulty with written text)  [] Low numeracy (difficulty with mathematical information  [] Low health literacy (difficulty with understanding health information & services  [] Language  [] Functional limitation  [] Pain   [] Financial  [] Transportation  [x] None   Favorite Ways to Learn   [x] Lecture  [] Slides  [] Reading [] Video-Internet  [] Cassettes/CDs/MP3's  [] Interactive Small Groups [x] Other       Behavioral Assessment  Current self-care practices  Educator assessment (5/26/2021)   Healthy Eating  Current practices  24-hour Dietary Recall:  Breakfast: none; tea-lemon juice; honey or sugar  Lunch: crab, shrimp, potato, sausage, egg bowl; water  Dinner: turkey sandwich; let, miranda, cheese, onion with chips; water  Snacks: ice cream  Beverages: water  Alcohol: glass of red moscato     Would benefit from DSMES related to Healthy Eating: Yes      Eats a carbohydrate controlled diet: No      Stage of change: Preparation     Pt has a significant allergy to Pork   Being Active  Current practices  How many days during the past week have you performed physical activity where your heart beats faster and your breathing is harder than normal for 30 minutes or more? 4 days    How many days in a typical week do you perform activity such as this?  4 days     Would benefit from [facility] related to Being Active: Yes      Exercises 150 minutes/week: No      Stage of change: Action     Monitoring  Current practices  Do you monitor your blood sugar? No    How often do you monitor? Never        Do you know your last A1c measurement? No    Do you know the meaning of the A1c? No     Would benefit from [facility] related to Monitoring: No      Uses BG readings to establish trends and understand BG patterns: No      Stage of change: na   Taking Medication  Current practices  Do you understand what your diabetes medications do? na    How often do you miss doses of your diabetes medications?  Not taking diabetes medication    Can you afford your diabetes medications? na   Would benefit from [facility] related to Taking Medication: No      Takes medications consistently to receive full benefit: na      Stage of change: na       Healthy Coping   Current state  Diabetes Skills, Confidence and Preparedness Index: Total score: 4.0  Skills: 3.1  Confidence: 4.4  Preparedness: 5.2   Would benefit from DSMES related to Healthy Coping: Yes      Identifies specific people, organizations,etc, that actively support their diabetes self-care efforts: Yes      Stage of change: Preparation     Reducing Risks  Current state  Vaccines:  Influenza:   Immunization History   Administered Date(s) Administered    Influenza Vaccine Aff[employer]) PF (>6 Mo Flulaval, Fluarix, and >3 Yrs Afluria, Fluzone 24529) 02/06/2020, 10/06/2020       Pneumococcal: There is no immunization history for the selected administration types on file for this patient. Hepatitis: There is no immunization history for the selected administration types on file for this patient. Examinations:  Diabetic Foot and Eye Exam HM Status   Topic Date Due    Diabetic Foot Care  Never done    Eye Exam  Never done    Eye exam; last one 1. 5 years ago    Dental exam: scheduled 6/1/21    Foot exam: DUE    Heart Protection:  BP Readings from Last 2 Encounters:   05/03/21 111/70   11/10/20 (!) 154/94        Lab Results   Component Value Date/Time    LDL, calculated 93.8 05/03/2021 09:35 AM        Kidney Protection:  No results found for: MCACR, MCA1, MCA2, MCA3, MCAU, MCAU2, MCALPOCT     Would benefit from [facility] related to Reducing Risks: Yes      Actively participates in decision-making with provider regarding secondary prevention:  Yes      Stage of change: Action   Problem Solving  Current state  Hypoglycemia Management:  What are signs and symptoms of hypoglycemia that you experience? Pt reported being unaware of s/s of hypoglycemia    How do you prevent hypoglycemia?  Pt reported being unaware of how to prevent hypoglycemia    How do you treat hypoglycemia? Pt reported being unaware of how to treat hypoglycemia    Hyperglycemia Management:  What are signs and symptoms of hyperglycemia that you experience? Pt reported being unaware of s/s of hyperglycemia    How can you prevent hyperglycemia? Pt reported being unaware of how to prevent hyperglycemia    Sick Day Management:  What do you do differently on sick days? Pt reported being unaware of self-management on sick days    Pattern Management:  Do you notice blood glucose patterns when you look at the readings in your meter or logbook?  No    How do you use the blood glucose readings from your meter or logbook? not checking BS at this time     Would benefit from [facility] related to Problem Solving: Yes, related to sick days and in general       Articulates appropriate strategies to address hypoglycemia, hyperglycemia, sick day care and BG pattern: No      Stage of change: Precontemplation     Note: Content derived from the American Association of Diabetes Educators' Diabetes Education Curriculum: A Guide to Successful Self-Management (3rd edition)      Nalini Briceño RD , [facility] [place] on 5/26/2021 at 3:43 PM      Time in appointment: 30 minutes    Overall SCPI score: 4.0 Skills Score: 3.1  Low: Blood Sugar Monitoring(Q3),Blood Sugar Monitoring(Q4),Problem Solving(Q6),Healthy Coping(Q7),Blood Sugar Monitoring(Q8),Reducing Risks(Q9) Confidence Score: 4.4  Low: Blood Sugar Monitoring(Q6),Problem Solving(Q7) Preparedness Score: 5.2  Low: Healthy Coping(Q3)  Healthy Eating Score: 6.0  Low: Skills(Q1),Confidence(Q1),Confidence(Q4),Preparedness(Q1) Taking Medication Score: 5.0  Low: Skills(Q2) Blood Sugar Monitoring Score: 2.8  Low: Skills(Q3),Skills(Q4),Skills(Q8),Confidence(Q6) Reducing Risks Score: 3.3  Low: FSZVMH(Q1)  Problem Solving Score: 3.3  Low: Skills(Q6),Confidence(Q7) Healthy Coping Score: 3.3  Low: Skills(Q7),Preparedness(Q3) Being Active Score: 6.0  Low: Confidence(Q5),Preparedness(Q2)    Skills/Knowledge Questions  1. I know how to plan meals that have the best balance between carbohydrates, proteins and vegetables. 6  2. I know how my diabetes medications (pills, injectables and/or insulin) work in my body. 5  3. I know when to check my blood sugar if I want to see how my body responded to a meal. 2  4. I know when to check my blood sugars to determine if my medication or insulin doses are correct. 2  5. I know what to do to prevent a low blood sugar when I exercise (either before, during, or after). 5  6. When I am sick, I know what to do differently with my diabetes management. 2  7. I know how stress can affect my diabetes management. 2  8. When I look at my blood sugars over a given week, I can explain what my blood sugar pattern is. 2  9. I know what my target levels are for A1c, blood pressure and cholesterol. 2  Confidence Questions  1. I am confident that I can plan balanced meals and snacks. 6  2. I am confident that I can manage my stress. 6  3. I am confident that I can prevent a low blood sugar during or after exercise. 3  4. I am confident that the next time I eat out, I will be able to choose foods that best keep my blood sugars in target. 6  5. I am confident I can include exercise into my schedule. 6  6. I am confident that I can use my daily blood sugars to adjust my diet, my activity, and/or my insulin. 2  7. When something out of my normal routine happens, I am confident that I can problem-solve and keep my diabetes on track. 2  Preparedness Questions  1. Within the next month, I will begin to eat more balanced meals and snacks. 6  2. Within the next month, I will choose an exercise activity and I will start fitting it into my schedule. 6  3. Within the next month, I will make a list of stress management options that work for me. 2  4.  Within the next month, I will consistently plan ahead to prevent low blood sugars. 0  5. Within the next month, I will start adjusting my insulin doses on my own. 0  6. Within the next month, I will begin making changes to my diabetes management based on my daily blood sugars (eg - eating, activity and/or insulin). 6  7. Within the next month, I will begin making changes to my diabetes management to meet my overall goals (eg - eating, activity and/or insulin).  6

## 2021-06-02 ENCOUNTER — CLINICAL SUPPORT (OUTPATIENT)
Dept: DIABETES SERVICES | Age: 51
End: 2021-06-02
Payer: MEDICAID

## 2021-06-02 DIAGNOSIS — E11.9 NEW ONSET TYPE 2 DIABETES MELLITUS (HCC): Primary | ICD-10-CM

## 2021-06-02 PROCEDURE — G0108 DIAB MANAGE TRN  PER INDIV: HCPCS | Performed by: DIETITIAN, REGISTERED

## 2021-06-02 NOTE — PROGRESS NOTES
[facility]  Diabetes Self-Management Education & Support Program  Encounter note    SUMMARY  Diabetes self-care management training was completed related to Reducing risks. The participant will return on June 14 to continue DSMES related to Healthy eating. The participant did not identify SMART Goal(s): - no goall set at this session, and will practice knowledge and skills related to healthy eating and being active to improve diabetes self-management. EVALUATION:  Pt is up to date on most of personal care record. He made notes of the things he needs to discuss with his provider at next visit    RECOMMENDATIONS:  Foot exam and urine microalbumin at next provider visit  Discuss pna and hep b vaccines  Scheduled for dentist in July  Needs to schedule baseline eye exam     Next provider visit is scheduled for TBS           DATE DSMES TOPIC EVALUATION     6/2/2021 WHAT IS DIABETES?   a. Role of the normal pancreas in energy balance and blood glucose control   b. The defect seen in diabetes   c. Signs & symptoms of diabetes   d. Diagnosis of diabetes   e. Types of diabetes   f. Blood glucose targets in non-pregnant & non-pregnant adults       The participant knows   Their type of diabetes Yes   The basic physiologic defect Yes   Blood glucose targets not checking BS at this time     DATE DSMES TOPIC EVALUATION     6/2/2021 HOW DO I STAY HEALTHY?   a. Prevention    Vaccinations    Preconception care (if applicable)  b. Examinations    Eye     Dental   Foot   c. Diabetic complications' prevention    Heart health    Kidney Health    Nerve health    Sleep health      The participant has a personal diabetes care record to keep abreast of diabetes health Yes    The participant would benefit from to discuss gaps in care at next provider visit. He marked the items he needs to discuss.                  Ben Pitts RD , [place] on 6/2/2021 at 10:39 AM      Time in appointment: 60 minutes

## 2021-06-14 ENCOUNTER — CLINICAL SUPPORT (OUTPATIENT)
Dept: DIABETES SERVICES | Age: 51
End: 2021-06-14
Payer: MEDICAID

## 2021-06-14 DIAGNOSIS — E11.9 NEW ONSET TYPE 2 DIABETES MELLITUS (HCC): Primary | ICD-10-CM

## 2021-06-14 PROCEDURE — G0108 DIAB MANAGE TRN  PER INDIV: HCPCS | Performed by: DIETITIAN, REGISTERED

## 2021-06-14 NOTE — PROGRESS NOTES
[address] [place] [address] for Diabetes Health  Diabetes Self-Management Education & Support Program  Encounter note    SUMMARY  Diabetes self-care management training was completed related to Healthy eating. The participant will return on June 21st continue DSMES related to Physical activity, Healthy coping and Problem solving. The participant did identify SMART Goal(s): - to eat breakfast 3x/week over next month, and will practice knowledge and skills related to healthy eating and being active to improve diabetes self-management. EVALUATION:  Pt was very engaged in meal planning session. He can verbalize several foods he needs to go home and read labels including popsicles and yogurt. He likes veggies so should be able to continue 1/2 plate of these. Some mornings due to pancreatitis he does not feel well so only uses broth as a meal. Needs to explore lower sodium options. He reports he is more aware of appropriate portions. RECOMMENDATIONS:  Try to eat 3 meals  a day of 45-60 gms carb plus non starchy veggies and 3-4 oz lean protein   Read labels for total carbohydrates along with serving size    Next provider visit is scheduled for none scheduled               DATE DSMES TOPIC EVALUATION     6/14/2021 WHAT CAN I EAT?   a. General principles   b. Determining a healthy weight   c. Nutritional terms & tools    Healthy Plate method    Carbohydrate Counting    Reading food labels    Free apps   d. Pregnancy recommendations      The participant    Uses Healthy Plate principles in constructing meals Yes   Reads food labels in choosing acceptable foods No    The participant would benefit from begin to read labels for total carbs  and measure some favorites like ice cream and rice.              Chiki Singer RD , [facility] on 6/14/2021 at 10:59 AM         Time in appointment: 70 minutes

## 2021-09-22 NOTE — PROGRESS NOTES
Patient is aware and verbalized understanding Vermilion Border Text: The closure involved the vermilion border.

## 2022-03-19 PROBLEM — Z72.0 TOBACCO ABUSE: Status: ACTIVE | Noted: 2020-10-14

## 2022-03-19 PROBLEM — D23.9: Status: ACTIVE | Noted: 2020-11-04

## 2022-05-16 ENCOUNTER — OFFICE VISIT (OUTPATIENT)
Dept: INTERNAL MEDICINE CLINIC | Age: 52
End: 2022-05-16
Payer: MEDICAID

## 2022-05-16 VITALS
TEMPERATURE: 98.4 F | OXYGEN SATURATION: 98 % | RESPIRATION RATE: 20 BRPM | WEIGHT: 179 LBS | HEART RATE: 55 BPM | HEIGHT: 68 IN | SYSTOLIC BLOOD PRESSURE: 111 MMHG | BODY MASS INDEX: 27.13 KG/M2 | DIASTOLIC BLOOD PRESSURE: 68 MMHG

## 2022-05-16 DIAGNOSIS — I10 ESSENTIAL HYPERTENSION: ICD-10-CM

## 2022-05-16 DIAGNOSIS — E11.9 TYPE 2 DIABETES MELLITUS WITHOUT COMPLICATION, WITHOUT LONG-TERM CURRENT USE OF INSULIN (HCC): Primary | ICD-10-CM

## 2022-05-16 DIAGNOSIS — Z13.220 SCREENING CHOLESTEROL LEVEL: ICD-10-CM

## 2022-05-16 DIAGNOSIS — Z12.5 PROSTATE CANCER SCREENING: ICD-10-CM

## 2022-05-16 DIAGNOSIS — N48.6 PEYRONIE'S DISEASE: ICD-10-CM

## 2022-05-16 PROCEDURE — 99214 OFFICE O/P EST MOD 30 MIN: CPT | Performed by: PHYSICIAN ASSISTANT

## 2022-05-16 RX ORDER — PANCRELIPASE LIPASE, PANCRELIPASE PROTEASE, PANCRELIPASE AMYLASE 40000; 126000; 168000 [USP'U]/1; [USP'U]/1; [USP'U]/1
CAPSULE, DELAYED RELEASE ORAL
COMMUNITY
Start: 2022-02-18

## 2022-05-16 RX ORDER — HYDROCHLOROTHIAZIDE 25 MG/1
25 TABLET ORAL DAILY
Qty: 90 TABLET | Refills: 1 | Status: SHIPPED | OUTPATIENT
Start: 2022-05-16 | End: 2022-09-22 | Stop reason: SDUPTHER

## 2022-05-16 RX ORDER — LISINOPRIL 40 MG/1
40 TABLET ORAL DAILY
Qty: 90 TABLET | Refills: 1 | Status: SHIPPED | OUTPATIENT
Start: 2022-05-16 | End: 2022-08-24 | Stop reason: SDUPTHER

## 2022-05-16 NOTE — PROGRESS NOTES
Subjective:     Mya Tang is a 54 y.o. male seen for follow up of diabetes. He also has diabetes and hypertension. Diabetic Review of Systems - medication compliance: compliant all of the time. Other symptoms and concerns: patient presents for medication refill and follow up. Also he would like to see a specialist. He states he has noticed for the past 4 months that his penis is bent to the left more with an erection. He states he has always had a little bit of a curvature but it is worse. He can not recall any trauma to his penis. Allergies   Allergen Reactions    Amlodipine Swelling     Patient reports that he had swelling of his lower extremity    Pork Derived (Porcine) Swelling and Angioedema    Sulfa (Sulfonamide Antibiotics) Rash             Review of Systems  Pertinent items are noted in HPI. Objective:     Visit Vitals  /68   Pulse (!) 55   Temp 98.4 °F (36.9 °C) (Temporal)   Resp 20   Ht 5' 8\" (1.727 m)   Wt 179 lb (81.2 kg)   SpO2 98%   BMI 27.22 kg/m²     Appearance: alert, well appearing, and in no distress. Exam: heart sounds normal rate and regular rhythm, no murmurs noted, chest clear, feet: warm, good capillary refill and normal monofilament exam  Lab review: orders written for new lab studies as appropriate; see orders. Assessment/Plan:     diabetes waiting on labs, hypertension well controlled. Diagnoses and all orders for this visit:    1. Type 2 diabetes mellitus without complication, without long-term current use of insulin (HCC)  -     METABOLIC PANEL, COMPREHENSIVE; Future  -     MICROALBUMIN, UR, RAND W/ MICROALB/CREAT RATIO; Future  -     HEMOGLOBIN A1C WITH EAG; Future    2. Essential hypertension  -     METABOLIC PANEL, COMPREHENSIVE; Future  -     lisinopriL (PRINIVIL, ZESTRIL) 40 mg tablet; Take 1 Tablet by mouth daily. -     hydroCHLOROthiazide (HYDRODIURIL) 25 mg tablet; Take 1 Tablet by mouth daily.     3. Screening cholesterol level  -     LIPID PANEL; Future    4. Peyronie's disease  -     REFERRAL TO UROLOGY    5. Prostate cancer screening  -     PSA, DIAGNOSTIC (PROSTATE SPECIFIC AG); Future    routine labs ordered. Referral to the urologist placed. His medications has been refilled. He will need to follow up in 3-4 months for recheck.

## 2022-05-17 ENCOUNTER — TELEPHONE (OUTPATIENT)
Dept: INTERNAL MEDICINE CLINIC | Age: 52
End: 2022-05-17

## 2022-05-17 DIAGNOSIS — E11.9 TYPE 2 DIABETES MELLITUS WITHOUT COMPLICATION, WITHOUT LONG-TERM CURRENT USE OF INSULIN (HCC): Primary | ICD-10-CM

## 2022-05-17 RX ORDER — METFORMIN HYDROCHLORIDE 500 MG/1
500 TABLET ORAL 2 TIMES DAILY WITH MEALS
Qty: 60 TABLET | Refills: 3 | Status: SHIPPED | OUTPATIENT
Start: 2022-05-17 | End: 2022-09-22 | Stop reason: SDUPTHER

## 2022-08-24 DIAGNOSIS — I10 ESSENTIAL HYPERTENSION: ICD-10-CM

## 2022-08-25 RX ORDER — LISINOPRIL 40 MG/1
40 TABLET ORAL DAILY
Qty: 90 TABLET | Refills: 0 | Status: SHIPPED | OUTPATIENT
Start: 2022-08-25

## 2022-08-25 NOTE — TELEPHONE ENCOUNTER
Future Appointments:  Future Appointments   Date Time Provider Rosita Cantu   10/17/2022  9:45 AM Santiago West MD [facility]        Last Appointment With Me:  Visit date not found     Requested Prescriptions     Pending Prescriptions Disp Refills    lisinopriL (PRINIVIL, ZESTRIL) 40 mg tablet 90 Tablet 1     Sig: Take 1 Tablet by mouth daily.

## 2022-09-22 DIAGNOSIS — E11.9 TYPE 2 DIABETES MELLITUS WITHOUT COMPLICATION, WITHOUT LONG-TERM CURRENT USE OF INSULIN (HCC): ICD-10-CM

## 2022-09-22 DIAGNOSIS — I10 ESSENTIAL HYPERTENSION: ICD-10-CM

## 2022-09-27 RX ORDER — HYDROCHLOROTHIAZIDE 25 MG/1
25 TABLET ORAL DAILY
Qty: 90 TABLET | Refills: 1 | Status: SHIPPED | OUTPATIENT
Start: 2022-09-27 | End: 2022-10-24 | Stop reason: SDUPTHER

## 2022-09-27 NOTE — TELEPHONE ENCOUNTER
Patient states that he is completely out of metformin and is hoping that this medication can be refilled as soon as possible.

## 2022-09-28 ENCOUNTER — TELEPHONE (OUTPATIENT)
Dept: INTERNAL MEDICINE CLINIC | Age: 52
End: 2022-09-28

## 2022-09-28 RX ORDER — METFORMIN HYDROCHLORIDE 500 MG/1
500 TABLET ORAL 2 TIMES DAILY WITH MEALS
Qty: 60 TABLET | Refills: 3 | Status: SHIPPED | OUTPATIENT
Start: 2022-09-28 | End: 2022-09-28 | Stop reason: SDUPTHER

## 2022-09-28 RX ORDER — METFORMIN HYDROCHLORIDE 500 MG/1
500 TABLET ORAL 2 TIMES DAILY WITH MEALS
Qty: 60 TABLET | Refills: 3 | Status: SHIPPED | OUTPATIENT
Start: 2022-09-28 | End: 2022-10-24 | Stop reason: SDUPTHER

## 2022-09-28 NOTE — TELEPHONE ENCOUNTER
Mailed appt reminder for patient per Patient's request.     Signed By: Jose Loyd     September 28, 2022

## 2022-10-24 ENCOUNTER — OFFICE VISIT (OUTPATIENT)
Dept: INTERNAL MEDICINE CLINIC | Age: 52
End: 2022-10-24
Payer: MEDICAID

## 2022-10-24 VITALS
WEIGHT: 186 LBS | SYSTOLIC BLOOD PRESSURE: 109 MMHG | RESPIRATION RATE: 20 BRPM | HEART RATE: 60 BPM | OXYGEN SATURATION: 97 % | BODY MASS INDEX: 28.19 KG/M2 | TEMPERATURE: 98.6 F | HEIGHT: 68 IN | DIASTOLIC BLOOD PRESSURE: 71 MMHG

## 2022-10-24 DIAGNOSIS — E11.9 TYPE 2 DIABETES MELLITUS WITHOUT COMPLICATION, WITHOUT LONG-TERM CURRENT USE OF INSULIN (HCC): ICD-10-CM

## 2022-10-24 DIAGNOSIS — I10 ESSENTIAL HYPERTENSION: Primary | ICD-10-CM

## 2022-10-24 DIAGNOSIS — Z23 NEEDS FLU SHOT: ICD-10-CM

## 2022-10-24 DIAGNOSIS — I10 ESSENTIAL HYPERTENSION: ICD-10-CM

## 2022-10-24 PROCEDURE — 90686 IIV4 VACC NO PRSV 0.5 ML IM: CPT | Performed by: INTERNAL MEDICINE

## 2022-10-24 PROCEDURE — 99214 OFFICE O/P EST MOD 30 MIN: CPT | Performed by: INTERNAL MEDICINE

## 2022-10-24 RX ORDER — HYDROCHLOROTHIAZIDE 25 MG/1
25 TABLET ORAL DAILY
Qty: 90 TABLET | Refills: 1 | Status: SHIPPED | OUTPATIENT
Start: 2022-10-24

## 2022-10-24 RX ORDER — METFORMIN HYDROCHLORIDE 500 MG/1
500 TABLET ORAL 2 TIMES DAILY WITH MEALS
Qty: 180 TABLET | Refills: 1 | Status: SHIPPED | OUTPATIENT
Start: 2022-10-24 | End: 2023-01-22

## 2022-10-24 NOTE — PROGRESS NOTES
Patient present for routine immunizations. Pt denies any symptoms , reactions or allergies that would exclude them from being immunized today. Risks and adverse reactions were discussed and the VIS was given to them. All questions were addressed. Pt was observed for 10 min post injection. There were no reactions observed.         Yaz Patrick

## 2022-10-24 NOTE — PATIENT INSTRUCTIONS
Vaccine Information Statement    Influenza (Flu) Vaccine (Inactivated or Recombinant): What You Need to Know    Many vaccine information statements are available in [place] and other languages. See www.immunize.org/vis. Hojas de información sobre vacunas están disponibles en español y en muchos otros idiomas. Visite www.immunize.org/vis. 1. Why get vaccinated? Influenza vaccine can prevent influenza (flu). Flu is a contagious disease that spreads around the [facility] every year, usually between October and May. Anyone can get the flu, but it is more dangerous for some people. Infants and young children, people 72 years and older, pregnant people, and people with certain health conditions or a weakened immune system are at greatest risk of flu complications. Pneumonia, bronchitis, sinus infections, and ear infections are examples of flu-related complications. If you have a medical condition, such as heart disease, cancer, or diabetes, flu can make it worse. Flu can cause fever and chills, sore throat, muscle aches, fatigue, cough, headache, and runny or stuffy nose. Some people may have vomiting and diarrhea, though this is more common in children than adults. In an average year, thousands of people in the [facility] die from flu, and many more are hospitalized. Flu vaccine prevents millions of illnesses and flu-related visits to the doctor each year. 2. Influenza vaccines     CDC recommends everyone 6 months and older get vaccinated every flu season. Children 6 months through 6years of age may need 2 doses during a single flu season. Everyone else needs only 1 dose each flu season. It takes about 2 weeks for protection to develop after vaccination. There are many flu viruses, and they are always changing. Each year a new flu vaccine is made to protect against the influenza viruses believed to be likely to cause disease in the upcoming flu season.  Even when the vaccine doesnt exactly match these viruses, it may still provide some protection. Influenza vaccine does not cause flu. Influenza vaccine may be given at the same time as other vaccines. 3. Talk with your health care provider    Tell your vaccination provider if the person getting the vaccine:  Has had an allergic reaction after a previous dose of influenza vaccine, or has any severe, life-threatening allergies   Has ever had Guillain-Barré Syndrome (also called GBS)    In some cases, your health care provider may decide to postpone influenza vaccination until a future visit. Influenza vaccine can be administered at any time during pregnancy. People who are or will be pregnant during influenza season should receive inactivated influenza vaccine. People with minor illnesses, such as a cold, may be vaccinated. People who are moderately or severely ill should usually wait until they recover before getting influenza vaccine. Your health care provider can give you more information. 4. Risks of a vaccine reaction    Soreness, redness, and swelling where the shot is given, fever, muscle aches, and headache can happen after influenza vaccination. There may be a very small increased risk of Guillain-Barré Syndrome (GBS) after inactivated influenza vaccine (the flu shot). Ashutosh Power children who get the flu shot along with pneumococcal vaccine (PCV13) and/or DTaP vaccine at the same time might be slightly more likely to have a seizure caused by fever. Tell your health care provider if a child who is getting flu vaccine has ever had a seizure. People sometimes faint after medical procedures, including vaccination. Tell your provider if you feel dizzy or have vision changes or ringing in the ears. As with any medicine, there is a very remote chance of a vaccine causing a severe allergic reaction, other serious injury, or death. 5. What if there is a serious problem?     An allergic reaction could occur after the vaccinated person leaves the clinic. If you see signs of a severe allergic reaction (hives, swelling of the face and throat, difficulty breathing, a fast heartbeat, dizziness, or weakness), call 9-1-1 and get the person to the nearest hospital.    For other signs that concern you, call your health care provider. Adverse reactions should be reported to the Vaccine Adverse Event Reporting System (VAERS). Your health care provider will usually file this report, or you can do it yourself. Visit the VAERS website at www.vaers. [facility].gov or call 4-407.655.6629. VAERS is only for reporting reactions, and VAERS staff members do not give medical advice. 6. The National Vaccine Injury Compensation Program    The [facility] Vaccine Injury Compensation Program (VICP) is a federal program that was created to compensate people who may have been injured by certain vaccines. Claims regarding alleged injury or death due to vaccination have a time limit for filing, which may be as short as two years. Visit the VICP website at www.[facility].gov/vaccinecompensation or call 1-910.778.4740 to learn about the program and about filing a claim. 7. How can I learn more? Ask your health care provider. Call your local or state health department. Visit the website of the Food and Drug Administration (FDA) for vaccine package inserts and additional information at www.fda.gov/vaccines-blood-biologics/vaccines. Contact the Centers for Disease Control and Prevention (CDC): Call 8-826.469.6748 (6-529-CUX-INFO) or  Visit CDCs influenza website at www.cdc.gov/flu. Vaccine Information Statement   Inactivated Influenza Vaccine   8/6/2021  42  [id number]   Department of Health and Human Services  Centers for Disease Control and Prevention    Office Use Only

## 2022-10-24 NOTE — PROGRESS NOTES
Destiny Daly is a 50 y.o. male  Chief Complaint   Patient presents with    Follow-up    Follow Up Chronic Condition    Diabetes    Hypertension     Visit Vitals  /71 (BP 1 Location: Right upper arm, BP Patient Position: Sitting, BP Cuff Size: Adult)   Pulse 60   Temp 98.6 °F (37 °C) (Oral)   Resp 20   Ht 5' 8\" (1.727 m)   Wt 186 lb (84.4 kg)   SpO2 97%   BMI 28.28 kg/m²          HPI   is a 50 y.o. male who presented today for follow up on DM and HTN. Patient has no acute concerns on this visit. He wants to have combination medication for HTN if possible to decrease pill burden. His blood pressure has been well controlled. DM, compliant with medication metformin, diet is not optimal. His last A1c was 8.6 and restarted his medication. He was compliant with medication since then. Reports hemorrhoid that is painful and mild bleeding associated with it. The swelling has improved and he had a history of surgical correction, he declined referral to Surgery at this time as symptoms has resolved right now. He had a recent colonoscopy which doesn't mention off the hemorrhoid. Patient also sees urology for pyrone's dieasse and plan is to have injection to the glans penis, per patient. .  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   Past Medical History:   Diagnosis Date    Diabetes [facility])     diagnosed May 2021    H/O chronic pancreatitis     Hypertension     Tobacco abuse Allergies   Allergen Reactions    Amlodipine Swelling     Patient reports that he had swelling of his lower extremity    Pork Derived (Porcine) Swelling and Angioedema    Sulfa (Sulfonamide Antibiotics) Rash          ROS  Review of Systems   All other systems reviewed and are negative. EXAM  Physical Exam  Constitutional:       General: He is not in acute distress. Appearance: Normal appearance. He is not toxic-appearing. HENT:      Head: Normocephalic and atraumatic. Nose: No congestion or rhinorrhea. Eyes:      General:         Right eye: No discharge. Extraocular Movements: Extraocular movements intact. Cardiovascular:      Rate and Rhythm: Normal rate and regular rhythm. Heart sounds: Normal heart sounds. No murmur heard. No gallop. Pulmonary:      Effort: Pulmonary effort is normal. No respiratory distress. Breath sounds: Normal breath sounds. No wheezing or rales. Abdominal:      General: There is no distension. Palpations: Abdomen is soft. There is no mass. Tenderness: There is no abdominal tenderness. There is no right CVA tenderness, left CVA tenderness, guarding or rebound. Hernia: No hernia is present. Musculoskeletal:         General: No swelling or deformity. Right lower leg: No edema. Skin:     Coloration: Skin is not jaundiced. Findings: No bruising or lesion. Neurological:      General: No focal deficit present. Mental Status: He is alert and oriented to person, place, and time. Motor: No weakness.    Psychiatric:         Mood and Affect: Mood normal.       Health Maintenance Due   Topic Date Due    Pneumococcal 0-64 years (1 - PCV) Never done    Foot Exam Q1  Never done    Eye Exam Retinal or Dilated  Never done    Hepatitis B Vaccine (1 of 3 - Risk 3-dose series) Never done    DTaP/Tdap/Td series (1 - Tdap) Never done    Shingrix Vaccine Age 50> (1 of 2) Never done    COVID-19 Vaccine (4 - Booster for [place] series) 07/09/2022       ASSESSMENT/PLAN    Diagnoses and all orders for this visit:    1. Essential hypertension  -     hydroCHLOROthiazide (HYDRODIURIL) 25 mg tablet; Take 1 Tablet by mouth daily. 2. Type 2 diabetes mellitus without complication, without long-term current use of insulin (HCC)  -     metFORMIN (GLUCOPHAGE) 500 mg tablet; Take 1 Tablet by mouth two (2) times daily (with meals) for 90 days.     3. Needs flu shot  -     INFLUENZA, FLUARIX, FLULAVAL, FLUZONE (AGE 6 MO+), AFLURIA(AGE 3Y+) IM, PF, 0.5 ML          Alec Bonds MD

## 2022-10-26 LAB
ANION GAP SERPL CALC-SCNC: 6 MMOL/L (ref 5–15)
BUN SERPL-MCNC: 6 MG/DL (ref 6–20)
BUN/CREAT SERPL: 8 (ref 12–20)
CALCIUM SERPL-MCNC: 8.4 MG/DL (ref 8.5–10.1)
CHLORIDE SERPL-SCNC: 101 MMOL/L (ref 97–108)
CO2 SERPL-SCNC: 30 MMOL/L (ref 21–32)
CREAT SERPL-MCNC: 0.77 MG/DL (ref 0.7–1.3)
EST. AVERAGE GLUCOSE BLD GHB EST-MCNC: 160 MG/DL
GLUCOSE SERPL-MCNC: 150 MG/DL (ref 65–100)
HBA1C MFR BLD: 7.2 % (ref 4–5.6)
POTASSIUM SERPL-SCNC: 3.4 MMOL/L (ref 3.5–5.1)
SODIUM SERPL-SCNC: 137 MMOL/L (ref 136–145)

## 2022-10-28 ENCOUNTER — TELEPHONE (OUTPATIENT)
Dept: INTERNAL MEDICINE CLINIC | Age: 52
End: 2022-10-28

## 2022-10-28 NOTE — TELEPHONE ENCOUNTER
Tried calling the number listed on file but it's a [facility] service voicemail. Will send a letter to patient.

## 2022-10-28 NOTE — PROGRESS NOTES
Your A1c has improved to 7.2, which is very good, continue current medication. Your electrolytes show that your potassium is slightly lower. Please take half of the hydrochlorothiazide and eat more potassium rich food such as bananas and fruits.   Monitor your blood pressure and let me know if resting blood pressure is consistently higher than 130/80

## 2022-11-09 ENCOUNTER — OFFICE VISIT (OUTPATIENT)
Dept: INTERNAL MEDICINE CLINIC | Age: 52
End: 2022-11-09
Payer: MEDICAID

## 2022-11-09 VITALS
HEIGHT: 68 IN | HEART RATE: 55 BPM | TEMPERATURE: 98.1 F | WEIGHT: 190 LBS | OXYGEN SATURATION: 97 % | RESPIRATION RATE: 16 BRPM | BODY MASS INDEX: 28.79 KG/M2 | DIASTOLIC BLOOD PRESSURE: 75 MMHG | SYSTOLIC BLOOD PRESSURE: 113 MMHG

## 2022-11-09 DIAGNOSIS — I10 ESSENTIAL HYPERTENSION: ICD-10-CM

## 2022-11-09 DIAGNOSIS — R09.82 PND (POST-NASAL DRIP): ICD-10-CM

## 2022-11-09 DIAGNOSIS — J02.9 SORE THROAT: Primary | ICD-10-CM

## 2022-11-09 PROCEDURE — 99213 OFFICE O/P EST LOW 20 MIN: CPT | Performed by: INTERNAL MEDICINE

## 2022-11-09 NOTE — PROGRESS NOTES
Yashira Joseph is a 50 y.o. male  Chief Complaint   Patient presents with    Sore Throat     Started three weeks ago - when he wakes up in the mornings is the most painful to swallow      Visit Vitals  /75 (BP 1 Location: Left upper arm, BP Patient Position: Sitting, BP Cuff Size: Adult)   Pulse (!) 55   Temp 98.1 °F (36.7 °C) (Temporal)   Resp 16   Ht 5' 8\" (1.727 m)   Wt 190 lb (86.2 kg)   SpO2 97%   BMI 28.89 kg/m²          HPI  Mr.Dwayne Mery Morales presents with 3 weeks duration of sore throat and dysphagia and phlegm in his throat. When he swallows the phlegm, it is usually painful and it can last all the morning. He doesn't have nasal congestion,fever, sinus pain or tenderness. .  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 Past Medical History:   Diagnosis Date    Diabetes [facility])     diagnosed May 2021    H/O chronic pancreatitis     Hypertension     Tobacco abuse         Allergies   Allergen Reactions    Amlodipine Swelling     Patient reports that he had swelling of his lower extremity    Pork Derived (Porcine) Swelling and Angioedema    Sulfa (Sulfonamide Antibiotics) Rash          ROS  Review of Systems   All other systems reviewed and are negative. EXAM  Physical Exam  Vitals reviewed. Constitutional:       Appearance: Normal appearance. HENT:      Head: Normocephalic and atraumatic. Nose: No congestion.       Mouth/Throat:      Mouth: Mucous membranes are moist. Pharynx: No oropharyngeal exudate or posterior oropharyngeal erythema. Cardiovascular:      Rate and Rhythm: Normal rate and regular rhythm. Pulses: Normal pulses. Heart sounds: Normal heart sounds. No murmur heard. Neurological:      Mental Status: He is alert. Health Maintenance Due   Topic Date Due    Pneumococcal 0-64 years (1 - PCV) Never done    Foot Exam Q1  Never done    Eye Exam Retinal or Dilated  Never done    Hepatitis B Vaccine (1 of 3 - Risk 3-dose series) Never done    DTaP/Tdap/Td series (1 - Tdap) Never done    COVID-19 Vaccine (4 - Booster for Pfizer series) 02/12/2022    Shingrix Vaccine Age 50> (1 of 2) Never done       ASSESSMENT/PLAN    Diagnoses and all orders for this visit:    1. Sore throat  -     REFERRAL TO ENT-OTOLARYNGOLOGY    2. PND (post-nasal drip)    3. Essential hypertension    -patient may have sinus drainage  -advised to get flonase over the counter and use it daily, saline irrigation of nose daily   -referral to ENT given if this doesn't get better   -Blood pressure still controlled.  Reduced HCTZ by half due to hypokalemia   Moise Herbert MD

## 2022-11-10 NOTE — PROGRESS NOTES
Spoke with patient and gave lab results/instructions per Dr. Isabella Solitario verbalized understanding. Sent information for My Chart.

## 2023-01-25 ENCOUNTER — TELEPHONE (OUTPATIENT)
Dept: INTERNAL MEDICINE CLINIC | Age: 53
End: 2023-01-25

## 2023-01-25 NOTE — TELEPHONE ENCOUNTER
Pt states that he has a sore throat and a cough that has been present for a week. I advised pt to take a COVID test and call us back with the result and then we can go from there to schedule him an appointment.

## 2023-02-18 DIAGNOSIS — I10 ESSENTIAL HYPERTENSION: ICD-10-CM

## 2023-02-20 RX ORDER — LISINOPRIL 40 MG/1
TABLET ORAL
Qty: 90 TABLET | Refills: 0 | Status: SHIPPED | OUTPATIENT
Start: 2023-02-20

## 2023-02-23 ENCOUNTER — OFFICE VISIT (OUTPATIENT)
Dept: INTERNAL MEDICINE CLINIC | Age: 53
End: 2023-02-23
Payer: MEDICAID

## 2023-02-23 ENCOUNTER — NURSE TRIAGE (OUTPATIENT)
Dept: OTHER | Facility: CLINIC | Age: 53
End: 2023-02-23

## 2023-02-23 VITALS
RESPIRATION RATE: 18 BRPM | BODY MASS INDEX: 27.28 KG/M2 | HEIGHT: 68 IN | WEIGHT: 180 LBS | HEART RATE: 70 BPM | TEMPERATURE: 98.3 F | SYSTOLIC BLOOD PRESSURE: 101 MMHG | DIASTOLIC BLOOD PRESSURE: 50 MMHG | OXYGEN SATURATION: 97 %

## 2023-02-23 DIAGNOSIS — K42.9 UMBILICAL HERNIA WITHOUT OBSTRUCTION AND WITHOUT GANGRENE: Primary | ICD-10-CM

## 2023-02-23 PROCEDURE — 99213 OFFICE O/P EST LOW 20 MIN: CPT | Performed by: INTERNAL MEDICINE

## 2023-02-23 RX ORDER — METFORMIN HYDROCHLORIDE 500 MG/1
TABLET ORAL
COMMUNITY
Start: 2023-02-18

## 2023-02-23 RX ORDER — OXYCODONE AND ACETAMINOPHEN 5; 325 MG/1; MG/1
1 TABLET ORAL
Qty: 10 TABLET | Refills: 0 | Status: SHIPPED | OUTPATIENT
Start: 2023-02-23 | End: 2023-02-26

## 2023-02-23 NOTE — PROGRESS NOTES
Chief Complaint   Patient presents with    Possible Hernia        Visit Vitals  BP (!) 101/50   Pulse 70   Temp 98.3 °F (36.8 °C)   Resp 18   Ht 5' 8\" (1.727 m)   Wt 180 lb (81.6 kg)   SpO2 97%   BMI 27.37 kg/m²        1. Have you been to the ER, urgent care clinic since your last visit? Hospitalized since your last visit? No    2. Have you seen or consulted any other health care providers outside of the [address] [place] [address] since your last visit? Include any pap smears or colon screening. No     Health Maintenance Due   Topic Date Due    Pneumococcal 0-64 years (1 - PCV) Never done    Foot Exam Q1  Never done    Hepatitis B Vaccine (1 of 3 - Risk 3-dose series) Never done    DTaP/Tdap/Td series (1 - Tdap) Never done    Shingles Vaccine (1 of 2) Never done    COVID-19 Vaccine (5 - Booster for Pfizer series) 01/10/2023        3 most recent PHQ Screens 2/23/2023   Little interest or pleasure in doing things Not at all   Feeling down, depressed, irritable, or hopeless Not at all   Total Score PHQ 2 0   Trouble falling or staying asleep, or sleeping too much -   Feeling tired or having little energy -   Poor appetite, weight loss, or overeating -   Feeling bad about yourself - or that you are a failure or have let yourself or your family down -   Trouble concentrating on things such as school, work, reading, or watching TV -   Moving or speaking so slowly that other people could have noticed; or the opposite being so fidgety that others notice -   Thoughts of being better off dead, or hurting yourself in some way -   PHQ 9 Score -   How difficult have these problems made it for you to do your work, take care of your home and get along with others -        No flowsheet data found.     Learning Assessment 2/6/2020   PRIMARY LEARNER Patient   PRIMARY LANGUAGE ENGLISH   LEARNER PREFERENCE PRIMARY DEMONSTRATION   ANSWERED BY patient   RELATIONSHIP SELF

## 2023-02-23 NOTE — TELEPHONE ENCOUNTER
Location of patient: [facility]    Received call from srinivasa at [facility] with Red Flag Complaint. Subjective: Caller states \"abd pain\"     Current Symptoms: abd pain    Onset: abd pain after sneezing last night , after sneezing umbilicus popped out sl dif color , is soft did reduce some last night with pushing on it hx htn diabetes and chronic pancreatitis, pain better now than it was last night did sleep all night     Associated Symptoms: NA    Pain Severity: 7/10 worse last night    Temperature: none     What has been tried: pushed on it     LMP: NA Pregnant: NA    Recommended disposition: See in Office Today    Care advice provided, patient verbalizes understanding; denies any other questions or concerns; instructed to call back for any new or worsening symptoms. Patient/Caller agrees with recommended disposition; writer provided warm [facility] for appointment scheduling    Attention Provider: Thank you for allowing me to participate in the care of your patient. The patient was connected to triage in response to information provided to the [facility]. Please do not respond through this encounter as the response is not directed to a shared pool.       Reason for Disposition   MODERATE pain (e.g., interferes with normal activities) that comes and goes (cramps) lasts > 24 hours  (Exception: pain with Vomiting or Diarrhea - see that Protocol)    Protocols used: Abdominal Pain - Male-ADULT-[place]

## 2023-02-24 NOTE — PROGRESS NOTES
Taylor Zeng is a 50 y.o. male  Chief Complaint   Patient presents with    Possible Hernia     Visit Vitals  BP (!) 101/50   Pulse 70   Temp 98.3 °F (36.8 °C)   Resp 18   Ht 5' 8\" (1.727 m)   Wt 180 lb (81.6 kg)   SpO2 97%   BMI 27.37 kg/m²          HPI  Mr.Dwayne Krystal Swenson presents due to pain and bulding around his umbilicus. He noted it day before presentation and it has sever pain and swelling. It doesn't appear to be necrotic. Faisal Olson Past Medical History:   Diagnosis Date    Diabetes [facility])     diagnosed May 2021    H/O chronic pancreatitis     Hypertension     Tobacco abuse             ROS  Review of Systems   All other systems reviewed and are negative. EXAM  Physical Exam  Vitals reviewed. Constitutional:       Appearance: Normal appearance. HENT:      Head: Normocephalic and atraumatic. Cardiovascular:      Rate and Rhythm: Normal rate and regular rhythm. Pulses: Normal pulses. Heart sounds: No murmur heard. Abdominal:      General: There is no distension. Comments: Tender umblical hernia   Musculoskeletal:         General: No swelling. Right lower leg: No edema. Left lower leg: No edema. Neurological:      Mental Status: He is alert. Health Maintenance Due   Topic Date Due    Pneumococcal 0-64 years (1 - PCV) Never done    Foot Exam Q1  Never done    Hepatitis B Vaccine (1 of 3 - Risk 3-dose series) Never done    DTaP/Tdap/Td series (1 - Tdap) Never done    Shingles Vaccine (1 of 2) Never done    COVID-19 Vaccine (5 - Booster for Pfizer series) 01/10/2023       ASSESSMENT/PLAN    Diagnoses and all orders for this visit:    1. Umbilical hernia without obstruction and without gangrene  -     REFERRAL TO GENERAL SURGERY  -     oxyCODONE-acetaminophen (PERCOCET) 5-325 mg per tablet; Take 1 Tablet by mouth every four (4) hours as needed for Pain for up to 3 days.  Max Daily Amount: 6 Tablets.  -avoid heavy lifting  -return if symptoms worsen or signs of constipation         Marilu Goodson MD

## 2023-02-28 DIAGNOSIS — I10 ESSENTIAL HYPERTENSION: ICD-10-CM

## 2023-03-01 RX ORDER — LISINOPRIL 40 MG/1
TABLET ORAL
Qty: 90 TABLET | Refills: 0 | Status: SHIPPED | OUTPATIENT
Start: 2023-03-01

## 2023-03-06 ENCOUNTER — OFFICE VISIT (OUTPATIENT)
Dept: SURGERY | Age: 53
End: 2023-03-06
Payer: MEDICAID

## 2023-03-06 VITALS
BODY MASS INDEX: 26.6 KG/M2 | DIASTOLIC BLOOD PRESSURE: 75 MMHG | TEMPERATURE: 97.7 F | WEIGHT: 175.5 LBS | OXYGEN SATURATION: 99 % | SYSTOLIC BLOOD PRESSURE: 114 MMHG | HEART RATE: 64 BPM | HEIGHT: 68 IN | RESPIRATION RATE: 16 BRPM

## 2023-03-06 DIAGNOSIS — K42.9 UMBILICAL HERNIA WITHOUT OBSTRUCTION AND WITHOUT GANGRENE: ICD-10-CM

## 2023-03-06 PROCEDURE — 99212 OFFICE O/P EST SF 10 MIN: CPT | Performed by: SURGERY

## 2023-03-06 PROCEDURE — 3074F SYST BP LT 130 MM HG: CPT | Performed by: SURGERY

## 2023-03-06 PROCEDURE — 3078F DIAST BP <80 MM HG: CPT | Performed by: SURGERY

## 2023-03-06 RX ORDER — OXYCODONE AND ACETAMINOPHEN 5; 325 MG/1; MG/1
1 TABLET ORAL
COMMUNITY

## 2023-03-06 RX ORDER — ACETAMINOPHEN 500 MG
TABLET ORAL
COMMUNITY

## 2023-03-06 NOTE — PROGRESS NOTES
Irineo Murray is a 50 y.o. male who is referred by Dr. Tyler Chaudhari for further evaluation of an umbilical hernia. Mr. Jimmy Olvera tells me that he recently noted an abdominal wall bulge at his umbilicus. No significant change in the size of the bulge. Associated periumbilical pain. No nausea or vomiting. No h/o chronic cough or constipation. Found to have an umbilical hernia. He has otherwise been in his usual state of health. Past Medical History:   Diagnosis Date    Diabetes [facility])     diagnosed May 2021    H/O chronic pancreatitis     Hypertension     Tobacco abuse     Umbilical hernia without obstruction and without gangrene 3/6/2023     Past Surgical History:   Procedure Laterality Date    HX COLONOSCOPY      HX OTHER SURGICAL  2014    Excision of benign mass of head. 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 Review of Systems   Respiratory:  Negative for cough. Gastrointestinal:  Positive for abdominal pain. Negative for constipation, nausea and vomiting. Physical Exam  Vitals reviewed.    Constitutional:       General: He is not in acute distress. Appearance: Normal appearance. He is normal weight. HENT:      Head: Normocephalic and atraumatic. Eyes:      General: No scleral icterus. Cardiovascular:      Rate and Rhythm: Normal rate and regular rhythm. Pulmonary:      Effort: Pulmonary effort is normal.      Breath sounds: Normal breath sounds. Abdominal:      General: There is no distension. Palpations: Abdomen is soft. Tenderness: There is no abdominal tenderness. There is no guarding or rebound. Hernia: A hernia is present. Hernia is present in the umbilical area. Musculoskeletal:         General: Normal range of motion. Cervical back: Neck supple. Lymphadenopathy:      Cervical: No cervical adenopathy. Neurological:      General: No focal deficit present. Mental Status: He is alert. ASSESSMENT and PLAN  Mr. Shani Anthony is a 50 y.o. male with an umbilical hernia. In view of the findings on H and P, he should benefit from repair since the hernia is symptomatic. I discussed umbilical hernia repair, possibly with mesh, with him today including the potential risks of bleeding, infection and umbilical hernia recurrence. He understands and wishes to proceed. I have tentatively scheduled Mr. Shani Anthony for surgery on March 16, 2023 at [facility] and will see him back in the office post-operatively. Activity as tolerated for now. Discussed plan with Mr. Shani Anthony and he is agreeable.       CC: Mychal Fang MD

## 2023-03-06 NOTE — PROGRESS NOTES
Identified pt with two pt identifiers (name and ). Reviewed chart in preparation for visit and have obtained necessary documentation. Teofilo Bearden is a 50 y.o. male  Chief Complaint   Patient presents with    Umbilical Hernia     Visit Vitals  /75 (BP 1 Location: Left upper arm, BP Patient Position: Sitting, BP Cuff Size: Large adult)   Pulse 64   Temp 97.7 °F (36.5 °C) (Oral)   Resp 16   Ht 5' 8\" (1.727 m)   Wt 175 lb 8 oz (79.6 kg)   SpO2 99%   BMI 26.68 kg/m²       1. Have you been to the ER, urgent care clinic since your last visit? Hospitalized since your last visit? No    2. Have you seen or consulted any other health care providers outside of the [address] [place] [address] since your last visit? Include any pap smears or colon screening.  No

## 2023-03-07 ENCOUNTER — ANESTHESIA EVENT (OUTPATIENT)
Dept: SURGERY | Age: 53
End: 2023-03-07
Payer: MEDICAID

## 2023-03-07 RX ORDER — ACETAMINOPHEN 325 MG/1
1000 TABLET ORAL ONCE
OUTPATIENT
Start: 2023-03-07 | End: 2023-03-07

## 2023-03-07 RX ORDER — BUPIVACAINE HYDROCHLORIDE 2.5 MG/ML
30 INJECTION, SOLUTION EPIDURAL; INFILTRATION; INTRACAUDAL ONCE
OUTPATIENT
Start: 2023-03-07 | End: 2023-03-07

## 2023-03-16 ENCOUNTER — ANESTHESIA (OUTPATIENT)
Dept: SURGERY | Age: 53
End: 2023-03-16
Payer: MEDICAID

## 2023-03-16 ENCOUNTER — HOSPITAL ENCOUNTER (OUTPATIENT)
Age: 53
Setting detail: OUTPATIENT SURGERY
Discharge: HOME | End: 2023-03-16
Attending: SURGERY | Admitting: SURGERY
Payer: MEDICAID

## 2023-03-16 VITALS
WEIGHT: 175 LBS | HEIGHT: 68 IN | DIASTOLIC BLOOD PRESSURE: 84 MMHG | BODY MASS INDEX: 26.52 KG/M2 | HEART RATE: 59 BPM | SYSTOLIC BLOOD PRESSURE: 126 MMHG | OXYGEN SATURATION: 93 % | RESPIRATION RATE: 20 BRPM | TEMPERATURE: 97.6 F

## 2023-03-16 DIAGNOSIS — K42.9 UMBILICAL HERNIA WITHOUT OBSTRUCTION AND WITHOUT GANGRENE: Primary | ICD-10-CM

## 2023-03-16 LAB
GLUCOSE BLD STRIP.AUTO-MCNC: 122 MG/DL (ref 65–117)
GLUCOSE BLD STRIP.AUTO-MCNC: 135 MG/DL (ref 65–117)
GLUCOSE BLD STRIP.AUTO-MCNC: 193 MG/DL (ref 65–117)
GLUCOSE BLD STRIP.AUTO-MCNC: 278 MG/DL (ref 65–117)
GLUCOSE BLD STRIP.AUTO-MCNC: 290 MG/DL (ref 65–117)
GLUCOSE BLD STRIP.AUTO-MCNC: 306 MG/DL (ref 65–117)
SERVICE CMNT-IMP: ABNORMAL

## 2023-03-16 PROCEDURE — 49592 RPR AA HRN 1ST < 3 NCR/STRN: CPT | Performed by: SURGERY

## 2023-03-16 PROCEDURE — 76210000020 HC REC RM PH II FIRST 0.5 HR: Performed by: SURGERY

## 2023-03-16 PROCEDURE — 74011636637 HC RX REV CODE- 636/637

## 2023-03-16 PROCEDURE — C1781 MESH (IMPLANTABLE): HCPCS | Performed by: SURGERY

## 2023-03-16 PROCEDURE — 77030008684 HC TU ET CUF COVD -B: Performed by: ANESTHESIOLOGY

## 2023-03-16 PROCEDURE — 2709999900 HC NON-CHARGEABLE SUPPLY: Performed by: SURGERY

## 2023-03-16 PROCEDURE — 77030026438 HC STYL ET INTUB CARD -A: Performed by: ANESTHESIOLOGY

## 2023-03-16 PROCEDURE — 74011000250 HC RX REV CODE- 250: Performed by: SURGERY

## 2023-03-16 PROCEDURE — 76010000153 HC OR TIME 1.5 TO 2 HR: Performed by: SURGERY

## 2023-03-16 PROCEDURE — 74011250637 HC RX REV CODE- 250/637: Performed by: SURGERY

## 2023-03-16 PROCEDURE — 82962 GLUCOSE BLOOD TEST: CPT

## 2023-03-16 PROCEDURE — 77030031139 HC SUT VCRL2 J&J -A: Performed by: SURGERY

## 2023-03-16 PROCEDURE — 74011250636 HC RX REV CODE- 250/636: Performed by: ANESTHESIOLOGY

## 2023-03-16 PROCEDURE — 74011000250 HC RX REV CODE- 250: Performed by: ANESTHESIOLOGY

## 2023-03-16 PROCEDURE — 76060000034 HC ANESTHESIA 1.5 TO 2 HR: Performed by: SURGERY

## 2023-03-16 PROCEDURE — 77030040361 HC SLV COMPR DVT MDII -B

## 2023-03-16 PROCEDURE — 74011250636 HC RX REV CODE- 250/636: Performed by: SURGERY

## 2023-03-16 PROCEDURE — 74011636637 HC RX REV CODE- 636/637: Performed by: ANESTHESIOLOGY

## 2023-03-16 PROCEDURE — 77030002933 HC SUT MCRYL J&J -A: Performed by: SURGERY

## 2023-03-16 PROCEDURE — 77030010507 HC ADH SKN DERMBND J&J -B: Performed by: SURGERY

## 2023-03-16 PROCEDURE — 76210000006 HC OR PH I REC 0.5 TO 1 HR: Performed by: SURGERY

## 2023-03-16 DEVICE — IMPLANTABLE DEVICE: Type: IMPLANTABLE DEVICE | Site: UMBILICAL | Status: FUNCTIONAL

## 2023-03-16 RX ORDER — SODIUM CHLORIDE 0.9 % (FLUSH) 0.9 %
5-40 SYRINGE (ML) INJECTION AS NEEDED
Status: DISCONTINUED | OUTPATIENT
Start: 2023-03-16 | End: 2023-03-16 | Stop reason: HOSPADM

## 2023-03-16 RX ORDER — BUPIVACAINE HYDROCHLORIDE 2.5 MG/ML
30 INJECTION, SOLUTION EPIDURAL; INFILTRATION; INTRACAUDAL ONCE
Status: COMPLETED | OUTPATIENT
Start: 2023-03-16 | End: 2023-03-16

## 2023-03-16 RX ORDER — CEFAZOLIN SODIUM/WATER 2 G/20 ML
SYRINGE (ML) INTRAVENOUS
Status: COMPLETED
Start: 2023-03-16 | End: 2023-03-16

## 2023-03-16 RX ORDER — ACETAMINOPHEN 500 MG
TABLET ORAL
Status: DISCONTINUED
Start: 2023-03-16 | End: 2023-03-16 | Stop reason: HOSPADM

## 2023-03-16 RX ORDER — GLYCOPYRROLATE 0.2 MG/ML
INJECTION INTRAMUSCULAR; INTRAVENOUS AS NEEDED
Status: DISCONTINUED | OUTPATIENT
Start: 2023-03-16 | End: 2023-03-16 | Stop reason: HOSPADM

## 2023-03-16 RX ORDER — ROCURONIUM BROMIDE 10 MG/ML
INJECTION, SOLUTION INTRAVENOUS AS NEEDED
Status: DISCONTINUED | OUTPATIENT
Start: 2023-03-16 | End: 2023-03-16 | Stop reason: HOSPADM

## 2023-03-16 RX ORDER — KETOROLAC TROMETHAMINE 30 MG/ML
INJECTION, SOLUTION INTRAMUSCULAR; INTRAVENOUS AS NEEDED
Status: DISCONTINUED | OUTPATIENT
Start: 2023-03-16 | End: 2023-03-16 | Stop reason: HOSPADM

## 2023-03-16 RX ORDER — MIDAZOLAM HYDROCHLORIDE 1 MG/ML
INJECTION, SOLUTION INTRAMUSCULAR; INTRAVENOUS AS NEEDED
Status: DISCONTINUED | OUTPATIENT
Start: 2023-03-16 | End: 2023-03-16 | Stop reason: HOSPADM

## 2023-03-16 RX ORDER — SODIUM CHLORIDE 0.9 % (FLUSH) 0.9 %
5-40 SYRINGE (ML) INJECTION EVERY 8 HOURS
Status: DISCONTINUED | OUTPATIENT
Start: 2023-03-16 | End: 2023-03-16 | Stop reason: HOSPADM

## 2023-03-16 RX ORDER — PROPOFOL 10 MG/ML
INJECTION, EMULSION INTRAVENOUS AS NEEDED
Status: DISCONTINUED | OUTPATIENT
Start: 2023-03-16 | End: 2023-03-16 | Stop reason: HOSPADM

## 2023-03-16 RX ORDER — LIDOCAINE HYDROCHLORIDE 10 MG/ML
0.1 INJECTION, SOLUTION EPIDURAL; INFILTRATION; INTRACAUDAL; PERINEURAL AS NEEDED
Status: DISCONTINUED | OUTPATIENT
Start: 2023-03-16 | End: 2023-03-16 | Stop reason: HOSPADM

## 2023-03-16 RX ORDER — FENTANYL CITRATE 50 UG/ML
25 INJECTION, SOLUTION INTRAMUSCULAR; INTRAVENOUS
Status: DISCONTINUED | OUTPATIENT
Start: 2023-03-16 | End: 2023-03-16 | Stop reason: HOSPADM

## 2023-03-16 RX ORDER — OXYCODONE HYDROCHLORIDE 5 MG/1
5 TABLET ORAL
Qty: 6 TABLET | Refills: 0 | Status: SHIPPED | OUTPATIENT
Start: 2023-03-16 | End: 2023-03-19

## 2023-03-16 RX ORDER — ONDANSETRON 2 MG/ML
INJECTION INTRAMUSCULAR; INTRAVENOUS AS NEEDED
Status: DISCONTINUED | OUTPATIENT
Start: 2023-03-16 | End: 2023-03-16 | Stop reason: HOSPADM

## 2023-03-16 RX ORDER — FENTANYL CITRATE 50 UG/ML
INJECTION, SOLUTION INTRAMUSCULAR; INTRAVENOUS AS NEEDED
Status: DISCONTINUED | OUTPATIENT
Start: 2023-03-16 | End: 2023-03-16 | Stop reason: HOSPADM

## 2023-03-16 RX ORDER — ACETAMINOPHEN 500 MG
1000 TABLET ORAL ONCE
Status: COMPLETED | OUTPATIENT
Start: 2023-03-16 | End: 2023-03-16

## 2023-03-16 RX ORDER — HYDROMORPHONE HYDROCHLORIDE 1 MG/ML
0.5 INJECTION, SOLUTION INTRAMUSCULAR; INTRAVENOUS; SUBCUTANEOUS
Status: DISCONTINUED | OUTPATIENT
Start: 2023-03-16 | End: 2023-03-16 | Stop reason: HOSPADM

## 2023-03-16 RX ORDER — ACETAMINOPHEN 500 MG
1000 TABLET ORAL
Qty: 20 TABLET | Refills: 2 | Status: SHIPPED | OUTPATIENT
Start: 2023-03-16

## 2023-03-16 RX ORDER — SODIUM CHLORIDE, SODIUM LACTATE, POTASSIUM CHLORIDE, CALCIUM CHLORIDE 600; 310; 30; 20 MG/100ML; MG/100ML; MG/100ML; MG/100ML
50 INJECTION, SOLUTION INTRAVENOUS CONTINUOUS
Status: DISCONTINUED | OUTPATIENT
Start: 2023-03-16 | End: 2023-03-16 | Stop reason: HOSPADM

## 2023-03-16 RX ORDER — NEOSTIGMINE METHYLSULFATE 1 MG/ML
INJECTION, SOLUTION INTRAVENOUS AS NEEDED
Status: DISCONTINUED | OUTPATIENT
Start: 2023-03-16 | End: 2023-03-16 | Stop reason: HOSPADM

## 2023-03-16 RX ORDER — SODIUM CHLORIDE 9 MG/ML
50 INJECTION, SOLUTION INTRAVENOUS CONTINUOUS
Status: DISCONTINUED | OUTPATIENT
Start: 2023-03-16 | End: 2023-03-16 | Stop reason: HOSPADM

## 2023-03-16 RX ORDER — CEFAZOLIN SODIUM/WATER 2 G/20 ML
2 SYRINGE (ML) INTRAVENOUS
Status: COMPLETED | OUTPATIENT
Start: 2023-03-16 | End: 2023-03-16

## 2023-03-16 RX ADMIN — ONDANSETRON 4 MG: 2 INJECTION INTRAMUSCULAR; INTRAVENOUS at 11:48

## 2023-03-16 RX ADMIN — MIDAZOLAM HYDROCHLORIDE 2 MG: 2 INJECTION, SOLUTION INTRAMUSCULAR; INTRAVENOUS at 11:15

## 2023-03-16 RX ADMIN — Medication 3 MG: at 12:22

## 2023-03-16 RX ADMIN — ROCURONIUM BROMIDE 40 MG: 10 INJECTION, SOLUTION INTRAVENOUS at 11:18

## 2023-03-16 RX ADMIN — KETOROLAC TROMETHAMINE 30 MG: 30 INJECTION INTRAMUSCULAR; INTRAVENOUS at 11:48

## 2023-03-16 RX ADMIN — FENTANYL CITRATE 100 MCG: 50 INJECTION, SOLUTION INTRAMUSCULAR; INTRAVENOUS at 11:49

## 2023-03-16 RX ADMIN — GLYCOPYRROLATE 0.4 MG: 0.2 INJECTION INTRAMUSCULAR; INTRAVENOUS at 12:22

## 2023-03-16 RX ADMIN — Medication 5 UNITS: at 10:48

## 2023-03-16 RX ADMIN — FENTANYL CITRATE 100 MCG: 50 INJECTION, SOLUTION INTRAMUSCULAR; INTRAVENOUS at 11:18

## 2023-03-16 RX ADMIN — PROPOFOL 200 MG: 10 INJECTION, EMULSION INTRAVENOUS at 11:18

## 2023-03-16 RX ADMIN — Medication 4 UNITS: at 09:47

## 2023-03-16 RX ADMIN — ACETAMINOPHEN 1000 MG: 500 TABLET ORAL at 09:51

## 2023-03-16 RX ADMIN — Medication 2 G: at 11:17

## 2023-03-16 NOTE — PERIOP NOTES
Theone Agent  1970  [id number]    Situation:  Verbal report given from: Dr. Tamela Fuller and Kelvin Luevano, RN  Procedure: Procedure(s):  REPAIR UMBILICAL HERNIA WITH MESH    Background:    Preoperative diagnosis: UMBILICAL HERNIA WITHOUT OBSTRUCTION AND WITHOUT GANGRENE    Postoperative diagnosis: UMBILICAL HERNIA WITHOUT OBSTRUCTION AND WITHOUT GANGRENE    :  Dr. Deena Guerrero    Assistant(s): Circ-1: Flores Chand Tech-1: Sanaz Guidry    Specimens: * No specimens in log *    Assessment:  Intra-procedure medications         Anesthesia gave intra-procedure sedation and medications, see anesthesia flow sheet     Intravenous fluids: LR@ KVO     Vital signs stable

## 2023-03-16 NOTE — H&P
Date of Surgery Update:  Misha Gibbs was seen and examined. History and physical has been reviewed. The patient has been examined. There have been no significant clinical changes since the completion of the originally dated History and Physical.  Patient identified by surgeon; surgical site was confirmed by patient and surgeon. Signed By: Miguel A Kingston MD     March 16, 2023 11:02 AM         Please note from the office and include the additional information below:    Past Medical History  Past Medical History:   Diagnosis Date    Diabetes [facility])     diagnosed May 2021    H/O chronic pancreatitis     Hypertension     Tobacco abuse     Umbilical hernia without obstruction and without gangrene 03/06/2023        Past Surgical History  Past Surgical History:   Procedure Laterality Date    HX COLONOSCOPY      HX OTHER SURGICAL  2014    Excision of benign mass of head. 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 Social History  The patient Misha Gibbs  reports that he has been smoking cigarettes. He started smoking about 8 years ago. He has been smoking an average of .25 packs per day. He has never used smokeless tobacco. He reports current alcohol use of about 3.0 standard drinks per week. He reports current drug use. Frequency: 1.00 time per week. Drug: Marijuana.      Family History  Family History   Problem Relation Age of Onset    Diabetes Mother     Hypertension Mother     Diabetes Sister     Diabetes Brother     Hypertension Brother

## 2023-03-16 NOTE — OP NOTES
[address]  OPERATIVE REPORT    Name:  Emelyn Cortes  MR#:  [id number]  :  1970  ACCOUNT #:  [de-identified]  DATE OF SERVICE:  2023    PREOPERATIVE DIAGNOSIS:  Incarcerated umbilical hernia. POSTOPERATIVE DIAGNOSIS:  Incarcerated umbilical hernia. PROCEDURE PERFORMED:  Repair of incarcerated umbilical hernia with mesh. SURGEON:  Constantine Mercer. Luh Pritchard MD    ASSISTANT:  ***. ANESTHESIA:  General endotracheal.    COMPLICATIONS:  None. SPECIMENS REMOVED:  None. IMPLANTS:  ***. ESTIMATED BLOOD LOSS:  Approximately 5 mL. FLUIDS:  Crystalloid 700 mL. DRAINS:  None. INDICATIONS FOR SURGERY:  The patient is a 65-year-old man with a symptomatic incarcerated umbilical hernia. The patient was brought to the operating room at this time for open repair, possibly with mesh. The risks of the procedure including, but not limited to infection, bleeding, and hernia recurrence were discussed in detail with the patient. The patient understood and wished to proceed. PROCEDURE:  After consent was obtained, the patient was brought to the operating room where he was placed in the supine position on the operating room table. Following the induction of an adequate level of general anesthesia via the endotracheal tube, compression devices were placed on both lower extremities. The abdomen was prepped with ChloraPrep and draped as a sterile field. Local anesthetic was infiltrated and an infraumbilical incision was opened sharply. Subcutaneous bleeders were carefully cauterized. The incision was carried down to the hernia sac which was readily identified and dissected free circumferentially. The hernia sac was then mobilized down onto the edge of the fascia. It should be noted that there appeared to be incarcerated fat within the hernia sac. The hernia sac and contents were then reduced. Using a combination of blunt and sharp dissection, the preperitoneal space was developed. The fascia was then mobilized such that a primary tension-free hernia repair could be completed. Palpation of the fascia from within revealed no additional hernia defects either superiorly or inferiorly. The wound was irrigated with saline, inspected and found to be hemostatic. It should be noted that the hernia defect was less than 3 cm in length. Attention was then directed towards the repair. A small Ventralex patch was brought on the field after first soaking it in saline. The patch was positioned appropriately and was secured circumferentially with transfascial 0 Vicryl sutures. The fascial defect was then closed superiorly and inferiorly with interrupted 0 Vicryl figure-of-eight sutures. Care was taken to incorporate the mesh with the suture. The Marlex tails were excised and the remaining leaves split. The mesh and fascia were closed with another 0 Vicryl figure-of-eight suture. At completion, the fascial closure was felt to be under no undue tension. The wound was irrigated again with saline, inspected and found to be hemostatic. Using a 2-0 Vicryl suture, the umbilicus was reconnected to the fascia. The surgical incision was closed with two layers of running 2-0 Vicryl suture followed by 4-0 Monocryl subcuticular suture to the skin. Additional local anesthetic was infiltrated and the incision was dressed with Dermabond. The patient was awakened from his general anesthetic and extubated in the operating room. He was transferred to the stretcher and brought to the recovery room in stable condition having tolerated the procedure well. At the conclusion of the procedure, all sponge counts, instrument counts, and needle counts were reported as correct x2.       Miriam Triana MD      [id number]  D:  03/16/2023 12:42  T:  03/16/2023 14:28  JOB #:  [id number]  CC:  MD Rock Lal MD

## 2023-03-16 NOTE — ANESTHESIA PREPROCEDURE EVALUATION
Relevant Problems   CARDIOVASCULAR   (+) Hypertension       Anesthetic History   No history of anesthetic complications            Review of Systems / Medical History  Patient summary reviewed and pertinent labs reviewed    Pulmonary  Within defined limits                 Neuro/Psych   Within defined limits           Cardiovascular    Hypertension              Exercise tolerance: >4 METS     GI/Hepatic/Renal  Within defined limits              Endo/Other    Diabetes: poorly controlled, type 2         Other Findings              Physical Exam    Airway  Mallampati: II  TM Distance: 4 - 6 cm  Neck ROM: normal range of motion   Mouth opening: Normal     Cardiovascular    Rhythm: regular  Rate: normal         Dental  No notable dental hx       Pulmonary  Breath sounds clear to auscultation               Abdominal  GI exam deferred       Other Findings            Anesthetic Plan    ASA: 3  Anesthesia type: general          Induction: Intravenous  Anesthetic plan and risks discussed with: Patient

## 2023-03-16 NOTE — DISCHARGE INSTRUCTIONS
Patient Discharge Instructions    Leeann Murrieta / [id number] : 1970    Admitted 3/16/2023 Discharged: 3/16/2023       It is important that you take the medication exactly as they are prescribed. Keep your medication in the bottles provided by the pharmacist and keep a list of the medication names, dosages, and times to be taken in your wallet. Do not take other medications without consulting your doctor. What to do at Home    Recommended diet: As Directed. Recommended activity: No Heavy Lifting (Less Than 10-15 Pounds). No Driving While Taking Oxycodone. Tylenol 1000 mg every 6 hours for two days and then 1000 mg every 6 hours as needed for pain. First dose at 4:00 PM.    May take Ibuprofen product (example Motrin) at 6:00 PM if needed. Ice pack to abdomen as needed. Oxycodone as needed for severe pain. May Take Shower or [place] Roxo. If you experience any of the following symptoms Fevers, Chills, Nausea, Vomitting, Redness or Drainage at Surgical Site(s) or Any Other Questions or Concerns Please Call -  [phone]. Follow-up with Dr. Zaheer Mann in 10-14 days. Information obtained by :  I understand that if any problems occur once I am at home I am to contact my physician. I understand and acknowledge receipt of the instructions indicated above.                                                                                                                                            Physician's or R.N.'s Signature                                                                  Date/Time                                                                                                                                              Patient or Representative Signature                                                          Date/Time        DISCHARGE SUMMARY from Nurse    PATIENT INSTRUCTIONS:    After general anesthesia or intravenous sedation, for 24 hours or while taking prescription Narcotics:  Limit your activities  Do not drive and operate hazardous machinery  Do not make important personal or business decisions  Do  not drink alcoholic beverages  If you have not urinated within 8 hours after discharge, please contact your surgeon on call. Report the following to your surgeon:  Excessive pain, swelling, redness or odor of or around the surgical area  Temperature over 100.5  Nausea and vomiting lasting longer than 4 hours or if unable to take medications  Any signs of decreased circulation or nerve impairment to extremity: change in color, persistent  numbness, tingling, coldness or increase pain  Any questions      These are general instructions for a healthy lifestyle:    No smoking/ No tobacco products/ Avoid exposure to second hand smoke  Surgeon General's Warning:  Quitting smoking now greatly reduces serious risk to your health. Obesity, smoking, and sedentary lifestyle greatly increases your risk for illness    A healthy diet, regular physical exercise & weight monitoring are important for maintaining a healthy lifestyle    You may be retaining fluid if you have a history of heart failure or if you experience any of the following symptoms:  Weight gain of 3 pounds or more overnight or 5 pounds in a week, increased swelling in our hands or feet or shortness of breath while lying flat in bed. Please call your doctor as soon as you notice any of these symptoms; do not wait until your next office visit. The discharge information has been reviewed with the patient and Sonia Martínez. The patient and Sonia Martínez verbalized understanding. Discharge medications reviewed with the patient and Sonia Martínez and appropriate educational materials and side effects teaching were provided.   ___________________________________________________________________________________________________________________________________           Learning About DJCHM-55 and Flu Symptoms  How can you tell COVID-19 from the flu? COVID-19 and the flu have similar symptoms. The two can be hard to tell apart. The only way to know for sure which illness you have is to be tested. If you have questions about COVID-19 testing, ask your doctor or go to cdc.gov to use the COVID-19 Viral Testing Tool. Since the symptoms are so alike, it makes sense to act as if you have COVID-19 until your test results come back. This means staying home and limiting contact with people in your home. You'll need to wash your hands often and disinfect surfaces that you touch. And be sure to wear a mask when you're around other people. This is also good advice if you think you have the flu. COVID-19 and the flu have these symptoms in common:  Fever or chills  Cough  Shortness of breath  Fatigue (tiredness)  Sore throat  Runny or stuffy nose  Muscle and body aches  Headache  Vomiting and diarrhea (more common in children than adults)  COVID-19 has another symptom that also may occur:  New loss of taste or smell  COVID-19 symptoms may appear from 2 to 14 days after infection. Flu symptoms usually appear 1 to 4 days after infection. Why should you get the flu vaccine? It's important to get your yearly flu vaccine. Both the flu and COVID-19 can be active at the same time. You can get sick with both infections at once. And having both may make you more sick than getting just one. The flu vaccine won't protect you from COVID-19. But it can help prevent the flu or reduce its symptoms. If fewer people get very ill with the flu, this will help free up medical resources that are needed for people who need urgent care, such as those suffering from COVID-19, heart attacks, and injuries from accidents. Where can you learn more? Go to http://www.[place].com/  Enter C123 in the search box to learn more about \"Learning About COVID-19 and Flu Symptoms. \"  Current as of: July 28, 2022 Content Version: 13.4  © 1268-8123 [employer], Incorporated. Care instructions adapted under license by [employer] (which disclaims liability or warranty for this information). If you have questions about a medical condition or this instruction, always ask your healthcare professional. [address] any warranty or liability for your use of this information.

## 2023-03-16 NOTE — BRIEF OP NOTE
Brief Postoperative Note    Patient: Warren Landon  YOB: 1970  MRN: [id number]    Date of Procedure: 3/16/2023     Pre-Op Diagnosis:  Incarcerated Umbilical Hernia. Post-Op Diagnosis:  Same. Procedure(s):   Repair Incarcerated Umbilical Hernia with Mesh. Surgeon(s):  Jason Tillman MD    Surgical Assistant: None    Anesthesia: General     Estimated Blood Loss (mL): Approximately 5 ml. Complications: None    Specimens: * No specimens in log *     Implants:   Implant Name Type Inv. Item Serial No.  Lot No. LRB No. Used Action   PATCH HUA SM DIA1.7IN CIR W/ STRP [employer] ABSRB - SN/A  PATCH HUA SM DIA1.7IN CIR W/ STRP [employer] ABSRB N/A BARD DAVOL_WD [id number] N/A 1 Implanted       Drains: * No LDAs found *    Findings: Incarcerated fat.      Electronically Signed by Venkata Engel MD on 3/16/2023 at 12:36 PM

## 2023-03-16 NOTE — PERIOP NOTES
Patient meets discharge criteria. Patient and Aileen Honor provided with verbal and written discharge instructions. Patient discharged by wheelchair with Aileen Lawndale to transport home.

## 2023-03-16 NOTE — PERIOP NOTES
Permission received to review discharge instructions and discuss private health information with girlfriend, Seth Gaspar. Patient states that his girlfriend will be with pt for at least 24 hours following today's procedure. Warm blanket(s) given to pt. Pt states they are comfortable, will continue to assess.

## 2023-03-18 RX ORDER — TRAMADOL HYDROCHLORIDE 50 MG/1
50 TABLET ORAL
Qty: 10 TABLET | Refills: 0 | Status: SHIPPED | OUTPATIENT
Start: 2023-03-18 | End: 2023-03-23

## 2023-03-20 ENCOUNTER — TELEPHONE (OUTPATIENT)
Dept: SURGERY | Age: 53
End: 2023-03-20

## 2023-03-20 NOTE — TELEPHONE ENCOUNTER
===========[id number]=================  Sat 18-Mar-23 09:51a  ======================================  Name: Scott Mcdowell Dr.: Judie Mcgowan        Patient: Shaista Munoz          PtDOB: 7. 9. 1970          Phone: [phone]        Message: Needs to get a refill  of  his pain meds is running low and is in  a lot of pain still    --------------------------------------  Message History  Account: [de-identified]  Taken:  Sat 18-Mar-2023  9:51a S  Serial#: 4  ===========[id number]      Patient requesting refill of pain medication.

## 2023-03-20 NOTE — TELEPHONE ENCOUNTER
Per chart patient was in contact with on call provider. Tramadol 50 mg sent into pharmacy on file. Provider updated.

## 2023-03-20 NOTE — TELEPHONE ENCOUNTER
Patient identified with two patient identifiers. Patient 4 day post umbilical hernia repair. Patient informed message received. Patient asked if he picked up Tramadol ordered by on call provider. Patient states his friend got for him but they have never heard of this medication and didn't take it. Patient confirmed he is now out of oxycodone and Tylenol as prescribed. Patient instructed to proceed with Tramadol as ordered he can alternate over the counter ibuprofen and should continue with ice as needed when at rest.  Patient has not moved bowels since discharged instructed to start over the counter stool softener. Patient informed if pain is getting worse and not better he needs office follow up to assess. Patient also asking when he can shower, patient informed per discharge instructions he can shower now. Patient states he believes he has an upcoming appointment with PCP. Patient informed we will need to see him if still no relief or becomes worse. Patient expressed understanding.

## 2023-03-20 NOTE — TELEPHONE ENCOUNTER
Patient called stating he hasn't received a call back and would like a refill on his pain medication.

## 2023-03-30 ENCOUNTER — OFFICE VISIT (OUTPATIENT)
Dept: SURGERY | Age: 53
End: 2023-03-30
Payer: MEDICAID

## 2023-03-30 VITALS
HEART RATE: 63 BPM | RESPIRATION RATE: 16 BRPM | TEMPERATURE: 98.2 F | HEIGHT: 68 IN | OXYGEN SATURATION: 97 % | BODY MASS INDEX: 26.37 KG/M2 | WEIGHT: 174 LBS | DIASTOLIC BLOOD PRESSURE: 62 MMHG | SYSTOLIC BLOOD PRESSURE: 100 MMHG

## 2023-03-30 DIAGNOSIS — Z87.19 H/O UMBILICAL HERNIA REPAIR: ICD-10-CM

## 2023-03-30 DIAGNOSIS — Z98.890 H/O UMBILICAL HERNIA REPAIR: ICD-10-CM

## 2023-03-30 PROBLEM — K42.9 UMBILICAL HERNIA WITHOUT OBSTRUCTION AND WITHOUT GANGRENE: Status: RESOLVED | Noted: 2023-03-06 | Resolved: 2023-03-30

## 2023-03-30 PROCEDURE — 99024 POSTOP FOLLOW-UP VISIT: CPT | Performed by: SURGERY

## 2023-03-30 NOTE — PROGRESS NOTES
Marcy Calderon is a 50 y.o. male who returns for post-operative evaluation. Mr. Lakhwinder Holcomb is s/p incarcerated umbilical hernia repair with mesh on March 16, 2023. Discharged to home that day. Doing fairly well since then. Post-operative pain resolved. No fevers or chills. No nausea or vomiting. No redness, drainage or swelling at surgical site. However, Mr. Lakhwinder Holcomb has noted abdominal wall bruising at his umbilicus. He has otherwise been in his usual state of health. Past Medical History:   Diagnosis Date    Diabetes [facility])     diagnosed May 2021    H/O chronic pancreatitis     H/O umbilical hernia repair 8/29/3670    Hypertension     Tobacco abuse     Umbilical hernia without obstruction and without gangrene 03/06/2023     Past Surgical History:   Procedure Laterality Date    HX COLONOSCOPY      HX HERNIA REPAIR  03/16/2023    Repair of incarcerated umbilical hernia with mesh. HX OTHER SURGICAL  2014    Excision of benign mass of head. 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[employer]   Tobacco Use    Smoking status: Every Day     Packs/day: 0.25     Types: Cigarettes     Start date: 2/6/2015    Smokeless tobacco: Never   Vaping Use    Vaping Use: Never used   Substance and Sexual Activity    Alcohol use:  Yes     Alcohol/week: 3.0 standard drinks     Types: 3 Glasses of wine per week     Comment: daily    Drug use: Yes     Frequency: 1.0 times per week     Types: Marijuana     Comment: intermittent cigar size last one week    Sexual activity: Yes     Partners: Female     Birth control/protection: None     Comment: none     Review of systems negative except as noted.    Review of Systems   Constitutional:  Negative for chills and fever. Gastrointestinal:  Negative for abdominal pain, nausea and vomiting. Physical Exam  Vitals reviewed. Constitutional:       General: He is not in acute distress. Appearance: Normal appearance. He is normal weight. HENT:      Head: Normocephalic and atraumatic. Cardiovascular:      Rate and Rhythm: Normal rate and regular rhythm. Pulmonary:      Effort: Pulmonary effort is normal.      Breath sounds: Normal breath sounds. Abdominal:      General: There is no distension. Palpations: Abdomen is soft. Tenderness: There is no abdominal tenderness. There is no guarding or rebound. Hernia: No hernia is present. There is no hernia in the umbilical area (No apparent recurrence. ). Comments: Well healed surgical scar. Periumbilical ecchymosis. Musculoskeletal:         General: Normal range of motion. Neurological:      General: No focal deficit present. Mental Status: He is alert. ASSESSMENT and PLAN  Mr. Ashutosh Mccrary is doing well post-operatively. I reviewed the operative findings with Mr. Ashutosh Mccrary today and reassured him that he is doing well thus far and that at this point in time, there is no apparent umbilical hernia recurrence. Also reassured him that the abdominal wall bruising will improve. Cleared Mr. Ashutosh Mccrary to ease back into usual activities as tolerated. Follow up with Dr. Petty Villagran as scheduled. Will see as needed.        CC: Cristela Thompson MD

## 2023-03-30 NOTE — PROGRESS NOTES
Identified pt with two pt identifiers (name and ). Reviewed chart in preparation for visit and have obtained necessary documentation. Quique Mcclellan is a 50 y.o. male  Chief Complaint   Patient presents with    Post OP Follow Up     2 weeks post  Repair of incarcerated umbilical hernia with mesh. Visit Vitals  /62 (BP 1 Location: Right arm, BP Patient Position: Sitting, BP Cuff Size: Large adult)   Pulse 63   Temp 98.2 °F (36.8 °C) (Oral)   Resp 16   Ht 5' 8\" (1.727 m)   Wt 174 lb (78.9 kg)   SpO2 97%   BMI 26.46 kg/m²       1. Have you been to the ER, urgent care clinic since your last visit? Hospitalized since your last visit? No    2. Have you seen or consulted any other health care providers outside of the [address] [place] [address] since your last visit? Include any pap smears or colon screening.  No

## 2023-04-17 ENCOUNTER — OFFICE VISIT (OUTPATIENT)
Dept: SURGERY | Age: 53
End: 2023-04-17
Payer: MEDICAID

## 2023-04-17 VITALS
OXYGEN SATURATION: 98 % | HEIGHT: 68 IN | TEMPERATURE: 98.3 F | DIASTOLIC BLOOD PRESSURE: 80 MMHG | RESPIRATION RATE: 18 BRPM | WEIGHT: 173 LBS | HEART RATE: 78 BPM | SYSTOLIC BLOOD PRESSURE: 118 MMHG | BODY MASS INDEX: 26.22 KG/M2

## 2023-04-17 DIAGNOSIS — Z98.890 H/O UMBILICAL HERNIA REPAIR: Primary | ICD-10-CM

## 2023-04-17 DIAGNOSIS — Z87.19 H/O UMBILICAL HERNIA REPAIR: Primary | ICD-10-CM

## 2023-04-17 PROCEDURE — 99024 POSTOP FOLLOW-UP VISIT: CPT | Performed by: SURGERY

## 2023-04-17 RX ORDER — IBUPROFEN 800 MG/1
800 TABLET ORAL
COMMUNITY
Start: 2023-04-11

## 2023-04-17 RX ORDER — AMOXICILLIN 500 MG/1
CAPSULE ORAL
COMMUNITY
Start: 2023-04-11

## 2023-04-17 NOTE — PROGRESS NOTES
Identified pt with two pt identifiers (name and ). Reviewed chart in preparation for visit and have obtained necessary documentation. Nan Alan is a 50 y.o. male  Chief Complaint   Patient presents with    Post OP Follow Up     3 weeks post Repair of incarcerated umbilical hernia with mesh     Visit Vitals  /80 (BP 1 Location: Right arm, BP Patient Position: Sitting, BP Cuff Size: Large adult)   Pulse 78   Temp 98.3 °F (36.8 °C) (Oral)   Resp 18   Ht 5' 8\" (1.727 m)   Wt 173 lb (78.5 kg)   SpO2 98%   BMI 26.30 kg/m²       1. Have you been to the ER, urgent care clinic since your last visit? Hospitalized since your last visit? No    2. Have you seen or consulted any other health care providers outside of the [address] [place] [address] since your last visit? Include any pap smears or colon screening.  No

## 2023-04-17 NOTE — PROGRESS NOTES
Julio Cates is a 50 y.o. male who returns for post-operative evaluation. Mr. Go Alfonso was last seen on March 30, 2023 for post-operative evaluation. Doing well since then. Mr. Go Alfonso tells me that he recently noted a \"knot\" above his umbilicus. Associated discomfort. No recurrent bulge at umbilicus. No redness or drainage at surgical site. No fevers or chills. No other complaints today. He has otherwise been in his usual state of health. Past Medical History:   Diagnosis Date    Diabetes [facility])     diagnosed May 2021    H/O chronic pancreatitis     H/O umbilical hernia repair 1/31/1657    Hypertension     Tobacco abuse     Umbilical hernia without obstruction and without gangrene 03/06/2023     Past Surgical History:   Procedure Laterality Date    HX COLONOSCOPY      HX HERNIA REPAIR  03/16/2023    Repair of incarcerated umbilical hernia with mesh. HX OTHER SURGICAL  2014    Excision of benign mass of head. 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     Review of Systems Constitutional:  Negative for chills and fever. Musculoskeletal:         Abdominal discomfort superior to umbilicus. Physical Exam  Vitals reviewed. Constitutional:       Appearance: Normal appearance. He is normal weight. HENT:      Head: Normocephalic and atraumatic. Cardiovascular:      Rate and Rhythm: Normal rate and regular rhythm. Pulmonary:      Effort: Pulmonary effort is normal.      Breath sounds: Normal breath sounds. Abdominal:      General: There is no distension. Palpations: Abdomen is soft. Tenderness: There is no abdominal tenderness. There is no guarding or rebound. Hernia: No hernia is present. There is no hernia in the umbilical area (No apparent recurrence. ). Comments: Superior to the umbilicus, there is a small, subcutaneous mass which is c/w a post-operative hematoma. Well healed infraumbilical scar. Musculoskeletal:         General: Normal range of motion. Neurological:      General: No focal deficit present. Mental Status: He is alert. ASSESSMENT and PLAN  Mr. Bernardo Costello is doing well post-operatively. Reassured Mr. Bernardo Costello that he is doing well thus far and that at this point in time, there is no apparent umbilical hernia recurrence. Explained to Mr. Bernardo Costello that the \"knot\" is most likely a post-operative hematoma and that it will gradually resolve. Activity as tolerated. Will see as needed.

## 2023-06-19 RX ORDER — PANCRELIPASE LIPASE, PANCRELIPASE PROTEASE, PANCRELIPASE AMYLASE 40000; 126000; 168000 [USP'U]/1; [USP'U]/1; [USP'U]/1
CAPSULE, DELAYED RELEASE ORAL
Qty: 720 CAPSULE | OUTPATIENT
Start: 2023-06-19

## 2023-07-17 LAB — HBA1C MFR BLD HPLC: 6.6 %

## 2023-08-18 DIAGNOSIS — I10 ESSENTIAL (PRIMARY) HYPERTENSION: ICD-10-CM

## 2023-08-18 RX ORDER — HYDROCHLOROTHIAZIDE 25 MG/1
TABLET ORAL
Qty: 90 TABLET | Refills: 1 | OUTPATIENT
Start: 2023-08-18

## 2023-08-19 RX ORDER — LISINOPRIL 40 MG/1
TABLET ORAL
Qty: 90 TABLET | Refills: 0 | Status: SHIPPED | OUTPATIENT
Start: 2023-08-19

## 2023-08-19 RX ORDER — HYDROCHLOROTHIAZIDE 25 MG/1
25 TABLET ORAL DAILY
Qty: 30 TABLET | Refills: 0 | Status: SHIPPED | OUTPATIENT
Start: 2023-08-19

## 2025-03-17 ENCOUNTER — TELEPHONE (OUTPATIENT)
Age: 55
End: 2025-03-17

## 2025-03-17 NOTE — TELEPHONE ENCOUNTER
Patient's significant other presented in clinic to schedule an appointment. She states the patient used to see Myrna Wade PA-C before she left [facility]. The patient established care with Dr. Santoyo, but no longer wishes to see him. I let her know that if the patient wants to be seen at [facility], he would have to see Dr. Santoyo as the providers do not allow swapping between PCPs. She states the patient would really like to see Myrna Wade PA-C again. I let her know that she is now at [facility] and provided their phone number for scheduling. I let her know that I could not guarantee that she is accepting new patients. She understood.

## (undated) DEVICE — SUTURE VCRL SZ 2-0 L27IN ABSRB UD L26MM SH 1/2 CIR J417H

## (undated) DEVICE — REM POLYHESIVE ADULT PATIENT RETURN ELECTRODE: Brand: VALLEYLAB

## (undated) DEVICE — HYPODERMIC SAFETY NEEDLE: Brand: MONOJECT

## (undated) DEVICE — DEVICE TRNSF SPIK STL 2008S] MICROTEK MEDICAL INC]

## (undated) DEVICE — STERILE POLYISOPRENE POWDER-FREE SURGICAL GLOVES: Brand: PROTEXIS

## (undated) DEVICE — TUBING SUCT 10FR MAL ALUM SHFT FN CAP VENT UNIV CONN W/ OBT

## (undated) DEVICE — KIT,1200CC CANISTER,3/16"X6' TUBING: Brand: MEDLINE INDUSTRIES, INC.

## (undated) DEVICE — 3M™ MEDIPORE™ H SOFT CLOTH TAPE SHORT ROLL TAPE 6INCHES X 2 YARDS 16 ROLLS/CASE 2866S: Brand: 3M™ MEDIPORE™

## (undated) DEVICE — ROCKER SWITCH PENCIL BLADE ELECTRODE, HOLSTER: Brand: EDGE

## (undated) DEVICE — DERMABOND SKIN ADH 0.7ML -- DERMABOND ADVANCED 12/BX

## (undated) DEVICE — SOL IRRIGATION INJ NACL 0.9% 500ML BTL

## (undated) DEVICE — SUTURE MCRYL SZ 4-0 L27IN ABSRB UD L19MM PS-2 1/2 CIR PRIM Y426H

## (undated) DEVICE — COVER LT HNDL PLAS RIG 1 PER PK

## (undated) DEVICE — TOWEL SURG W17XL27IN STD BLU COT NONFENESTRATED PREWASHED

## (undated) DEVICE — DRAPE,REIN 53X77,STERILE: Brand: MEDLINE

## (undated) DEVICE — PACK,EENT,TURBAN DRAPE,PK II: Brand: MEDLINE

## (undated) DEVICE — SUTURE SZ 0 27IN 5/8 CIR UR-6  TAPER PT VIOLET ABSRB VICRYL J603H

## (undated) DEVICE — STRAP,POSITIONING,KNEE/BODY,FOAM,4X60": Brand: MEDLINE

## (undated) DEVICE — SYR 10ML LUER LOK 1/5ML GRAD --

## (undated) DEVICE — SURGICAL PROCEDURE PACK BASIN MAJ SET CUST NO CAUT

## (undated) DEVICE — STRIP,CLOSURE,WOUND,MEDI-STRIP,1/2X4: Brand: MEDLINE

## (undated) DEVICE — INFECTION CONTROL KIT SYS

## (undated) DEVICE — PREP SKN CHLRAPRP APL 26ML STR --

## (undated) DEVICE — BASIN ST MAJOR-NO CAUTERY: Brand: MEDLINE INDUSTRIES, INC.

## (undated) DEVICE — NEEDLE HYPO 22GA L1.5IN BLK S STL HUB POLYPR SHLD REG BVL

## (undated) DEVICE — SOLUTION IRRIG 1000ML 0.9% SOD CHL USP POUR PLAS BTL